# Patient Record
Sex: MALE | Race: WHITE | NOT HISPANIC OR LATINO | Employment: UNEMPLOYED | URBAN - METROPOLITAN AREA
[De-identification: names, ages, dates, MRNs, and addresses within clinical notes are randomized per-mention and may not be internally consistent; named-entity substitution may affect disease eponyms.]

---

## 2019-08-25 ENCOUNTER — HOSPITAL ENCOUNTER (INPATIENT)
Facility: MEDICAL CENTER | Age: 64
LOS: 2 days | DRG: 041 | End: 2019-08-28
Attending: EMERGENCY MEDICINE | Admitting: INTERNAL MEDICINE
Payer: COMMERCIAL

## 2019-08-25 ENCOUNTER — HOSPITAL ENCOUNTER (OUTPATIENT)
Facility: MEDICAL CENTER | Age: 64
End: 2019-08-25

## 2019-08-25 DIAGNOSIS — R29.898 WEAKNESS OF LEFT UPPER EXTREMITY: ICD-10-CM

## 2019-08-25 PROCEDURE — 94760 N-INVAS EAR/PLS OXIMETRY 1: CPT

## 2019-08-25 PROCEDURE — 99291 CRITICAL CARE FIRST HOUR: CPT

## 2019-08-26 ENCOUNTER — APPOINTMENT (OUTPATIENT)
Dept: CARDIOLOGY | Facility: MEDICAL CENTER | Age: 64
DRG: 041 | End: 2019-08-26
Attending: INTERNAL MEDICINE
Payer: COMMERCIAL

## 2019-08-26 ENCOUNTER — APPOINTMENT (OUTPATIENT)
Dept: RADIOLOGY | Facility: MEDICAL CENTER | Age: 64
DRG: 041 | End: 2019-08-26
Attending: INTERNAL MEDICINE
Payer: COMMERCIAL

## 2019-08-26 ENCOUNTER — HOSPITAL ENCOUNTER (OUTPATIENT)
Dept: RADIOLOGY | Facility: MEDICAL CENTER | Age: 64
End: 2019-08-26

## 2019-08-26 PROBLEM — E87.1 HYPONATREMIA: Status: ACTIVE | Noted: 2019-08-26

## 2019-08-26 PROBLEM — E03.9 HYPOTHYROIDISM: Status: ACTIVE | Noted: 2019-08-26

## 2019-08-26 PROBLEM — I10 PRIMARY HYPERTENSION: Status: ACTIVE | Noted: 2019-08-26

## 2019-08-26 PROBLEM — I63.9 STROKE (HCC): Status: ACTIVE | Noted: 2019-08-26

## 2019-08-26 PROBLEM — E66.9 OBESITY: Status: ACTIVE | Noted: 2019-08-26

## 2019-08-26 PROBLEM — D72.829 LEUKOCYTOSIS: Status: ACTIVE | Noted: 2019-08-26

## 2019-08-26 PROBLEM — E11.9 TYPE 2 DIABETES MELLITUS, WITHOUT LONG-TERM CURRENT USE OF INSULIN (HCC): Status: ACTIVE | Noted: 2019-08-26

## 2019-08-26 LAB
ANION GAP SERPL CALC-SCNC: 8 MMOL/L (ref 0–11.9)
APTT PPP: 29.4 SEC (ref 24.7–36)
BASOPHILS # BLD AUTO: 0.6 % (ref 0–1.8)
BASOPHILS # BLD: 0.07 K/UL (ref 0–0.12)
BUN SERPL-MCNC: 17 MG/DL (ref 8–22)
CALCIUM SERPL-MCNC: 8.6 MG/DL (ref 8.5–10.5)
CHLORIDE SERPL-SCNC: 103 MMOL/L (ref 96–112)
CHOLEST SERPL-MCNC: 164 MG/DL (ref 100–199)
CO2 SERPL-SCNC: 22 MMOL/L (ref 20–33)
CREAT SERPL-MCNC: 0.99 MG/DL (ref 0.5–1.4)
EOSINOPHIL # BLD AUTO: 0.42 K/UL (ref 0–0.51)
EOSINOPHIL NFR BLD: 3.5 % (ref 0–6.9)
ERYTHROCYTE [DISTWIDTH] IN BLOOD BY AUTOMATED COUNT: 44.6 FL (ref 35.9–50)
EST. AVERAGE GLUCOSE BLD GHB EST-MCNC: 103 MG/DL
GLUCOSE SERPL-MCNC: 91 MG/DL (ref 65–99)
HBA1C MFR BLD: 5.2 % (ref 0–5.6)
HCT VFR BLD AUTO: 38.5 % (ref 42–52)
HDLC SERPL-MCNC: 26 MG/DL
HGB BLD-MCNC: 12.8 G/DL (ref 14–18)
IMM GRANULOCYTES # BLD AUTO: 0.09 K/UL (ref 0–0.11)
IMM GRANULOCYTES NFR BLD AUTO: 0.7 % (ref 0–0.9)
INR PPP: 1.07 (ref 0.87–1.13)
LDLC SERPL CALC-MCNC: 101 MG/DL
LV EJECT FRACT  99904: 65
LV EJECT FRACT MOD 2C 99903: 61.43
LV EJECT FRACT MOD 4C 99902: 66.58
LV EJECT FRACT MOD BP 99901: 68.39
LYMPHOCYTES # BLD AUTO: 3.32 K/UL (ref 1–4.8)
LYMPHOCYTES NFR BLD: 27.3 % (ref 22–41)
MCH RBC QN AUTO: 30.6 PG (ref 27–33)
MCHC RBC AUTO-ENTMCNC: 33.2 G/DL (ref 33.7–35.3)
MCV RBC AUTO: 92.1 FL (ref 81.4–97.8)
MONOCYTES # BLD AUTO: 1.06 K/UL (ref 0–0.85)
MONOCYTES NFR BLD AUTO: 8.7 % (ref 0–13.4)
NEUTROPHILS # BLD AUTO: 7.2 K/UL (ref 1.82–7.42)
NEUTROPHILS NFR BLD: 59.2 % (ref 44–72)
NRBC # BLD AUTO: 0 K/UL
NRBC BLD-RTO: 0 /100 WBC
PLATELET # BLD AUTO: 237 K/UL (ref 164–446)
PMV BLD AUTO: 9.8 FL (ref 9–12.9)
POTASSIUM SERPL-SCNC: 4.7 MMOL/L (ref 3.6–5.5)
PROTHROMBIN TIME: 14.2 SEC (ref 12–14.6)
RBC # BLD AUTO: 4.18 M/UL (ref 4.7–6.1)
SODIUM SERPL-SCNC: 133 MMOL/L (ref 135–145)
TRIGL SERPL-MCNC: 184 MG/DL (ref 0–149)
TROPONIN T SERPL-MCNC: 14 NG/L (ref 6–19)
WBC # BLD AUTO: 12.2 K/UL (ref 4.8–10.8)

## 2019-08-26 PROCEDURE — 85610 PROTHROMBIN TIME: CPT

## 2019-08-26 PROCEDURE — 70551 MRI BRAIN STEM W/O DYE: CPT

## 2019-08-26 PROCEDURE — 93306 TTE W/DOPPLER COMPLETE: CPT | Mod: 26 | Performed by: INTERNAL MEDICINE

## 2019-08-26 PROCEDURE — 700102 HCHG RX REV CODE 250 W/ 637 OVERRIDE(OP): Performed by: INTERNAL MEDICINE

## 2019-08-26 PROCEDURE — 92610 EVALUATE SWALLOWING FUNCTION: CPT

## 2019-08-26 PROCEDURE — 83036 HEMOGLOBIN GLYCOSYLATED A1C: CPT

## 2019-08-26 PROCEDURE — 99223 1ST HOSP IP/OBS HIGH 75: CPT | Performed by: INTERNAL MEDICINE

## 2019-08-26 PROCEDURE — A9270 NON-COVERED ITEM OR SERVICE: HCPCS | Performed by: INTERNAL MEDICINE

## 2019-08-26 PROCEDURE — 72141 MRI NECK SPINE W/O DYE: CPT

## 2019-08-26 PROCEDURE — 99291 CRITICAL CARE FIRST HOUR: CPT | Performed by: INTERNAL MEDICINE

## 2019-08-26 PROCEDURE — 84484 ASSAY OF TROPONIN QUANT: CPT

## 2019-08-26 PROCEDURE — 80061 LIPID PANEL: CPT

## 2019-08-26 PROCEDURE — 93306 TTE W/DOPPLER COMPLETE: CPT

## 2019-08-26 PROCEDURE — 85730 THROMBOPLASTIN TIME PARTIAL: CPT

## 2019-08-26 PROCEDURE — 770022 HCHG ROOM/CARE - ICU (200)

## 2019-08-26 PROCEDURE — 700105 HCHG RX REV CODE 258: Performed by: INTERNAL MEDICINE

## 2019-08-26 PROCEDURE — 93880 EXTRACRANIAL BILAT STUDY: CPT

## 2019-08-26 PROCEDURE — 84443 ASSAY THYROID STIM HORMONE: CPT

## 2019-08-26 PROCEDURE — 85025 COMPLETE CBC W/AUTO DIFF WBC: CPT

## 2019-08-26 PROCEDURE — 80048 BASIC METABOLIC PNL TOTAL CA: CPT

## 2019-08-26 RX ORDER — BENAZEPRIL HYDROCHLORIDE 5 MG/1
5 TABLET ORAL DAILY
COMMUNITY

## 2019-08-26 RX ORDER — PROMETHAZINE HYDROCHLORIDE 25 MG/1
12.5-25 TABLET ORAL EVERY 4 HOURS PRN
Status: DISCONTINUED | OUTPATIENT
Start: 2019-08-26 | End: 2019-08-28 | Stop reason: HOSPADM

## 2019-08-26 RX ORDER — SODIUM CHLORIDE 9 MG/ML
INJECTION, SOLUTION INTRAVENOUS CONTINUOUS
Status: DISCONTINUED | OUTPATIENT
Start: 2019-08-26 | End: 2019-08-26

## 2019-08-26 RX ORDER — TRAMADOL HYDROCHLORIDE 50 MG/1
50 TABLET ORAL EVERY 8 HOURS PRN
COMMUNITY

## 2019-08-26 RX ORDER — TRAMADOL HYDROCHLORIDE 50 MG/1
50 TABLET ORAL ONCE
Status: COMPLETED | OUTPATIENT
Start: 2019-08-26 | End: 2019-08-26

## 2019-08-26 RX ORDER — BENAZEPRIL HYDROCHLORIDE 10 MG/1
5 TABLET ORAL DAILY
Status: DISCONTINUED | OUTPATIENT
Start: 2019-08-26 | End: 2019-08-27

## 2019-08-26 RX ORDER — DIVALPROEX SODIUM 500 MG/1
1000 TABLET, DELAYED RELEASE ORAL 2 TIMES DAILY
Status: DISCONTINUED | OUTPATIENT
Start: 2019-08-26 | End: 2019-08-28 | Stop reason: HOSPADM

## 2019-08-26 RX ORDER — ALFUZOSIN HYDROCHLORIDE 10 MG/1
10 TABLET, EXTENDED RELEASE ORAL DAILY
COMMUNITY

## 2019-08-26 RX ORDER — VENLAFAXINE 50 MG/1
50 TABLET ORAL
Status: DISCONTINUED | OUTPATIENT
Start: 2019-08-26 | End: 2019-08-28 | Stop reason: HOSPADM

## 2019-08-26 RX ORDER — VENLAFAXINE HYDROCHLORIDE 75 MG/1
150 CAPSULE, EXTENDED RELEASE ORAL DAILY
Status: DISCONTINUED | OUTPATIENT
Start: 2019-08-26 | End: 2019-08-28 | Stop reason: HOSPADM

## 2019-08-26 RX ORDER — BISACODYL 10 MG
10 SUPPOSITORY, RECTAL RECTAL
Status: DISCONTINUED | OUTPATIENT
Start: 2019-08-26 | End: 2019-08-28 | Stop reason: HOSPADM

## 2019-08-26 RX ORDER — ASPIRIN 81 MG/1
81 TABLET, CHEWABLE ORAL DAILY
COMMUNITY
End: 2019-09-06

## 2019-08-26 RX ORDER — LABETALOL HYDROCHLORIDE 5 MG/ML
10 INJECTION, SOLUTION INTRAVENOUS EVERY 4 HOURS PRN
Status: DISCONTINUED | OUTPATIENT
Start: 2019-08-26 | End: 2019-08-28 | Stop reason: HOSPADM

## 2019-08-26 RX ORDER — HYDRALAZINE HYDROCHLORIDE 20 MG/ML
10 INJECTION INTRAMUSCULAR; INTRAVENOUS
Status: DISCONTINUED | OUTPATIENT
Start: 2019-08-26 | End: 2019-08-28 | Stop reason: HOSPADM

## 2019-08-26 RX ORDER — DIVALPROEX SODIUM 125 MG/1
1000 CAPSULE, COATED PELLETS ORAL 2 TIMES DAILY
Status: ON HOLD | COMMUNITY
End: 2019-08-26

## 2019-08-26 RX ORDER — VENLAFAXINE HYDROCHLORIDE 150 MG/1
150 CAPSULE, EXTENDED RELEASE ORAL DAILY
COMMUNITY

## 2019-08-26 RX ORDER — ASPIRIN 300 MG/1
300 SUPPOSITORY RECTAL DAILY
Status: DISCONTINUED | OUTPATIENT
Start: 2019-08-27 | End: 2019-08-27

## 2019-08-26 RX ORDER — ASPIRIN 81 MG/1
324 TABLET, CHEWABLE ORAL DAILY
Status: DISCONTINUED | OUTPATIENT
Start: 2019-08-27 | End: 2019-08-27

## 2019-08-26 RX ORDER — ATORVASTATIN CALCIUM 80 MG/1
80 TABLET, FILM COATED ORAL EVERY EVENING
Status: DISCONTINUED | OUTPATIENT
Start: 2019-08-26 | End: 2019-08-28 | Stop reason: HOSPADM

## 2019-08-26 RX ORDER — TAMSULOSIN HYDROCHLORIDE 0.4 MG/1
0.4 CAPSULE ORAL
Status: DISCONTINUED | OUTPATIENT
Start: 2019-08-26 | End: 2019-08-28 | Stop reason: HOSPADM

## 2019-08-26 RX ORDER — ACETAMINOPHEN 325 MG/1
650 TABLET ORAL EVERY 6 HOURS PRN
Status: DISCONTINUED | OUTPATIENT
Start: 2019-08-26 | End: 2019-08-28 | Stop reason: HOSPADM

## 2019-08-26 RX ORDER — NAPROXEN 500 MG/1
500 TABLET ORAL 2 TIMES DAILY WITH MEALS
Status: ON HOLD | COMMUNITY
End: 2019-08-28

## 2019-08-26 RX ORDER — DIVALPROEX SODIUM 500 MG/1
1000 TABLET, DELAYED RELEASE ORAL 2 TIMES DAILY
COMMUNITY
End: 2021-05-17

## 2019-08-26 RX ORDER — OMEPRAZOLE 20 MG/1
20 CAPSULE, DELAYED RELEASE ORAL DAILY
COMMUNITY
End: 2021-10-12

## 2019-08-26 RX ORDER — LEVOTHYROXINE SODIUM 0.2 MG/1
150 TABLET ORAL
COMMUNITY
End: 2021-05-28

## 2019-08-26 RX ORDER — ASPIRIN 325 MG
325 TABLET ORAL DAILY
Status: DISCONTINUED | OUTPATIENT
Start: 2019-08-27 | End: 2019-08-27

## 2019-08-26 RX ORDER — ONDANSETRON 2 MG/ML
4 INJECTION INTRAMUSCULAR; INTRAVENOUS EVERY 4 HOURS PRN
Status: DISCONTINUED | OUTPATIENT
Start: 2019-08-26 | End: 2019-08-28 | Stop reason: HOSPADM

## 2019-08-26 RX ORDER — LEVOTHYROXINE SODIUM 0.2 MG/1
200 TABLET ORAL
Status: DISCONTINUED | OUTPATIENT
Start: 2019-08-26 | End: 2019-08-28 | Stop reason: HOSPADM

## 2019-08-26 RX ORDER — ONDANSETRON 4 MG/1
4 TABLET, ORALLY DISINTEGRATING ORAL EVERY 4 HOURS PRN
Status: DISCONTINUED | OUTPATIENT
Start: 2019-08-26 | End: 2019-08-28 | Stop reason: HOSPADM

## 2019-08-26 RX ORDER — PROMETHAZINE HYDROCHLORIDE 25 MG/1
12.5-25 SUPPOSITORY RECTAL EVERY 4 HOURS PRN
Status: DISCONTINUED | OUTPATIENT
Start: 2019-08-26 | End: 2019-08-28 | Stop reason: HOSPADM

## 2019-08-26 RX ORDER — POLYETHYLENE GLYCOL 3350 17 G/17G
1 POWDER, FOR SOLUTION ORAL
Status: DISCONTINUED | OUTPATIENT
Start: 2019-08-26 | End: 2019-08-28 | Stop reason: HOSPADM

## 2019-08-26 RX ORDER — VENLAFAXINE 50 MG/1
50 TABLET ORAL
COMMUNITY
End: 2021-05-17

## 2019-08-26 RX ORDER — PROCHLORPERAZINE EDISYLATE 5 MG/ML
5-10 INJECTION INTRAMUSCULAR; INTRAVENOUS EVERY 4 HOURS PRN
Status: DISCONTINUED | OUTPATIENT
Start: 2019-08-26 | End: 2019-08-28 | Stop reason: HOSPADM

## 2019-08-26 RX ORDER — ALFUZOSIN HYDROCHLORIDE 10 MG/1
10 TABLET, EXTENDED RELEASE ORAL DAILY
Status: DISCONTINUED | OUTPATIENT
Start: 2019-08-26 | End: 2019-08-26

## 2019-08-26 RX ORDER — AMOXICILLIN 250 MG
2 CAPSULE ORAL 2 TIMES DAILY
Status: DISCONTINUED | OUTPATIENT
Start: 2019-08-26 | End: 2019-08-28 | Stop reason: HOSPADM

## 2019-08-26 RX ORDER — OMEPRAZOLE 20 MG/1
20 CAPSULE, DELAYED RELEASE ORAL DAILY
Status: DISCONTINUED | OUTPATIENT
Start: 2019-08-26 | End: 2019-08-28 | Stop reason: HOSPADM

## 2019-08-26 RX ADMIN — SODIUM CHLORIDE: 9 INJECTION, SOLUTION INTRAVENOUS at 04:45

## 2019-08-26 RX ADMIN — TRAMADOL HYDROCHLORIDE 50 MG: 50 TABLET, FILM COATED ORAL at 22:03

## 2019-08-26 RX ADMIN — TAMSULOSIN HYDROCHLORIDE 0.4 MG: 0.4 CAPSULE ORAL at 15:46

## 2019-08-26 RX ADMIN — LEVOTHYROXINE SODIUM 200 MCG: 25 TABLET ORAL at 15:44

## 2019-08-26 RX ADMIN — VENLAFAXINE 50 MG: 50 TABLET ORAL at 22:03

## 2019-08-26 RX ADMIN — BENAZEPRIL HYDROCHLORIDE 5 MG: 10 TABLET ORAL at 15:43

## 2019-08-26 RX ADMIN — ATORVASTATIN CALCIUM 80 MG: 80 TABLET, FILM COATED ORAL at 17:54

## 2019-08-26 RX ADMIN — VENLAFAXINE HYDROCHLORIDE 150 MG: 75 CAPSULE, EXTENDED RELEASE ORAL at 15:47

## 2019-08-26 RX ADMIN — OMEPRAZOLE 20 MG: 20 CAPSULE, DELAYED RELEASE ORAL at 15:46

## 2019-08-26 RX ADMIN — VITAMIN D, TAB 1000IU (100/BT) 1000 UNITS: 25 TAB at 15:47

## 2019-08-26 RX ADMIN — DIVALPROEX SODIUM 1000 MG: 500 TABLET, DELAYED RELEASE ORAL at 17:55

## 2019-08-26 RX ADMIN — SENNOSIDES, DOCUSATE SODIUM 2 TABLET: 50; 8.6 TABLET, FILM COATED ORAL at 17:55

## 2019-08-26 ASSESSMENT — ENCOUNTER SYMPTOMS
HEMOPTYSIS: 0
FEVER: 0
DIARRHEA: 0
NECK PAIN: 0
PHOTOPHOBIA: 0
BLOOD IN STOOL: 0
MYALGIAS: 0
FOCAL WEAKNESS: 1
DIZZINESS: 0
SORE THROAT: 0
SENSORY CHANGE: 0
DIAPHORESIS: 0
BRUISES/BLEEDS EASILY: 0
CHILLS: 0
NERVOUS/ANXIOUS: 0
SHORTNESS OF BREATH: 0
FLANK PAIN: 0
BLURRED VISION: 0
SEIZURES: 0
SPEECH CHANGE: 0
SPUTUM PRODUCTION: 0
HALLUCINATIONS: 0
EYE DISCHARGE: 0
BACK PAIN: 0
NAUSEA: 0
PALPITATIONS: 0
WHEEZING: 0
COUGH: 0
VOMITING: 0
ABDOMINAL PAIN: 0
EYE REDNESS: 0
HEADACHES: 0
STRIDOR: 0

## 2019-08-26 ASSESSMENT — COGNITIVE AND FUNCTIONAL STATUS - GENERAL
SUGGESTED CMS G CODE MODIFIER DAILY ACTIVITY: CJ
MOBILITY SCORE: 22
SUGGESTED CMS G CODE MODIFIER MOBILITY: CJ
HELP NEEDED FOR BATHING: A LITTLE
CLIMB 3 TO 5 STEPS WITH RAILING: A LITTLE
DRESSING REGULAR LOWER BODY CLOTHING: A LITTLE
WALKING IN HOSPITAL ROOM: A LITTLE
DAILY ACTIVITIY SCORE: 22

## 2019-08-26 ASSESSMENT — LIFESTYLE VARIABLES
HAVE YOU EVER FELT YOU SHOULD CUT DOWN ON YOUR DRINKING: NO
ALCOHOL_USE: NO
EVER HAD A DRINK FIRST THING IN THE MORNING TO STEADY YOUR NERVES TO GET RID OF A HANGOVER: NO
EVER HAD A DRINK FIRST THING IN THE MORNING TO STEADY YOUR NERVES TO GET RID OF A HANGOVER: NO
HOW MANY TIMES IN THE PAST YEAR HAVE YOU HAD 5 OR MORE DRINKS IN A DAY: 0
HAVE PEOPLE ANNOYED YOU BY CRITICIZING YOUR DRINKING: NO
HOW MANY TIMES IN THE PAST YEAR HAVE YOU HAD 5 OR MORE DRINKS IN A DAY: 0
SUBSTANCE_ABUSE: 0
AVERAGE NUMBER OF DAYS PER WEEK YOU HAVE A DRINK CONTAINING ALCOHOL: 0
ALCOHOL_USE: NO
HAVE PEOPLE ANNOYED YOU BY CRITICIZING YOUR DRINKING: NO
CONSUMPTION TOTAL: INCOMPLETE
EVER FELT BAD OR GUILTY ABOUT YOUR DRINKING: NO
ON A TYPICAL DAY WHEN YOU DRINK ALCOHOL HOW MANY DRINKS DO YOU HAVE: 0
HAVE YOU EVER FELT YOU SHOULD CUT DOWN ON YOUR DRINKING: NO
TOTAL SCORE: 0
TOTAL SCORE: 0
CONSUMPTION TOTAL: NEGATIVE
ON A TYPICAL DAY WHEN YOU DRINK ALCOHOL HOW MANY DRINKS DO YOU HAVE: 0
AVERAGE NUMBER OF DAYS PER WEEK YOU HAVE A DRINK CONTAINING ALCOHOL: 0
EVER_SMOKED: YES
TOTAL SCORE: 0

## 2019-08-26 NOTE — THERAPY
"Speech Language Therapy Clinical Swallow Evaluation completed.    Functional Status: The patient was AAOx4, no facial droop, slurred speech or deficits noted in oral exam.  Pt reports his LUE is getting stronger s/p tPA.  He consumed PO trials of ice chips, nectars, puree, soft solids, thin liquids and nectars without any overt s/sx of aspiration.  Vocal quality remained clear throughout evaluation, laryngeal elevation palpated as complete with all textures and oral phase was functional with all textures.  Recommend to start a regular diet with thin liquids.  SLP will continue to follow to ensure diet tolerance s/p tPA.     Recommendations - Diet: Regular, Thin Liquid                          Strategies: Monitor during meals and Head of Bed at 90 Degrees                          Medication Administration:  Whole with Liquid Wash    Plan of Care: Will benefit from Speech Therapy 3 times per week    Post-Acute Therapy: Anticipate that the patient will have no further speech therapy needs after discharge from the hospital.    See \"Rehab Therapy-Acute\" Patient Summary Report for complete documentation.  Thank you for the consult.  "

## 2019-08-26 NOTE — ASSESSMENT & PLAN NOTE
Body mass index is 35.62 kg/m².  Patient has been counseled on diet and lifestyle modifications  Nutritionist consult for pt education

## 2019-08-26 NOTE — ASSESSMENT & PLAN NOTE
Acute ischemic stroke symptoms characterized by the sudden onset of left upper extremity weakness  CT scan of the head negative for acute pathology at outside hospital  S/P alteplase at outside hospital  MRI reviewed  Continue high intensity statin  DAPT  No significant carotid stenosis - loop recorder ordered  Neuro checks every 4 hours

## 2019-08-26 NOTE — DISCHARGE PLANNING
Transitional Care Coordination     Referral from Dr. Singh.   Pt transferred from [place] for evaluation of suspected stroke. NIH score at 4 at the outside hospital, and in ED was 1-2.    Past medical history of hypertension, diabetes, chronic neck pain, hypothyroidism bipolar disorder.    TPA administered.   Stroke work up pending.   OT/PT/SLP (reg diet) consulted.    This referral will not be sent to [facility] for a consult per protocol.    Thank you for referral.   Will continue to follow.

## 2019-08-26 NOTE — PROGRESS NOTES
NIHSS and neuro exam completed in ER with ER RN.  Pt and wife escorted to R102 by ACLS RN and CCT.  Pt oriented to unit,

## 2019-08-26 NOTE — ED PROVIDER NOTES
"ED Provider Note    Scribed for Celena Feldman M.D. by Brandon Garcia. 8/26/2019  12:21 AM    Means of arrival: EMS  History obtained from: Patient  History limited by: None      CHIEF COMPLAINT  Chief Complaint   Patient presents with   • Possible Stroke     TPA administered pta       HPI  Jeremi Ingram is a 64 y.o. male with past medical history of hypertension, diabetes, bipolar disorder, who presents to the Emergency Department as a transfer from [place] for evaluation of suspected stroke with acute weakness to the left upper extremity onset 4 hours ago. Patient was administered TPA prior to arrival after a normal noncontrast head CT at the outside hospital.  Since receiving the medication, the patient reports moderate alleviation of weakness. He states that at around 8:30 tonight the patient was heading inside his house after working in the garage when he noticed that he could not  an object and lost control of his arm. Endorsed pain in the back on his neck described as \"heat\".  Patient denies current complaints at this time.  Denies any associated chest pain or shortness of breath. Patient additionally states that for the past 2 months he has become increasingly lightheaded when standing up.    REVIEW OF SYSTEMS  Pertinent positive include unilateral left sided upper extremity weakness and neck pain. Pertinent negative include no chest pain or dyspnea. All other systems reviewed and are negative.      PAST MEDICAL HISTORY   has a past medical history of Bipolar 1 disorder (HCC).    SOCIAL HISTORY  Social History     Tobacco Use   • Smoking status: Not on file   Substance and Sexual Activity   • Alcohol use: Not on file   • Drug use: Not on file   • Sexual activity: Not on file       SURGICAL HISTORY   has a past surgical history that includes polysomnography w cpap (4/6/2017).    CURRENT MEDICATIONS  Home Medications    **Home medications have not yet been reviewed for this encounter**   " "      ALLERGIES  Allergies   Allergen Reactions   • Lithium        PHYSICAL EXAM   VITAL SIGNS: /72   Pulse (!) 59   Temp 36.8 °C (98.2 °F) (Temporal)   Resp 12   Ht 1.854 m (6' 1\")   Wt 122.5 kg (270 lb)   SpO2 95%   BMI 35.62 kg/m²    Constitutional: Nontoxic appearing middle-aged male.  Alert in no apparent distress.  HENT: Normocephalic, Atraumatic. Bilateral external ears normal. Nose normal.  Moist mucous membranes.  Oropharynx clear.  Eyes: Pupils are equal and reactive. Conjunctiva normal.   Neck: Supple, full range of motion  Heart: Regular rate and rhythm.  No murmurs.    Lungs: No respiratory distress, normal work of breathing. Lungs clear to auscultation bilaterally.  Abdomen Soft, no distention.  No tenderness to palpation.  Musculoskeletal: Atraumatic. No obvious deformities noted.  No lower extremity edema.  Skin: Warm, Dry.  No erythema, No rash.   Neurologic: Alert and oriented x3.  Cranial nerves II-XII intact. Left upper extremity has 4/5 strength with weakness noted to be more proximal than distal. Strength 5/5 and sensation inact throughout all other extremities.  No pronator drift.  No dysmetria.  Normal speech. Normal gait.  Psychiatric: Affect normal, Mood normal, Appears appropriate and not intoxicated.      DIAGNOSTIC STUDIES    LABS  Personally reviewed by me  Labs Reviewed   CBC WITH DIFFERENTIAL - Abnormal; Notable for the following components:       Result Value    WBC 12.2 (*)     RBC 4.18 (*)     Hemoglobin 12.8 (*)     Hematocrit 38.5 (*)     MCHC 33.2 (*)     Monos (Absolute) 1.06 (*)     All other components within normal limits    Narrative:     Indicate which anticoagulants the patient is on:->UNKNOWN   BASIC METABOLIC PANEL - Abnormal; Notable for the following components:    Sodium 133 (*)     All other components within normal limits   PROTHROMBIN TIME    Narrative:     Indicate which anticoagulants the patient is on:->UNKNOWN   APTT    Narrative:     Indicate " "which anticoagulants the patient is on:->UNKNOWN   TROPONIN   ESTIMATED GFR         RADIOLOGY  Personally reviewed by me  OUTSIDE IMAGES-DX CHEST   Final Result      OUTSIDE IMAGES-CT HEAD   Final Result          ED COURSE  Vitals:    08/26/19 0015 08/26/19 0030 08/26/19 0045 08/26/19 0100   BP: 126/72 129/68 130/71 141/82   Pulse: (!) 59 63 62 (!) 57   Resp: 12 17 14 20   Temp:       TempSrc:       SpO2: 95% 88% 92% 95%   Weight:  122.5 kg (270 lb)     Height:  1.854 m (6' 1\")           Medications administered:  Medications - No data to display      Old records personally reviewed:  Patient was administered TPA prior to arrival after consultation with Dr. Quick  Reviewed outside records with normal CT of the head and reassuring labs.    12:21 AM Patient seen and examined at bedside. The patient presents with suspected stroke with unilateral left sided upper extremity weakness. Ordered for CBC with diff, BMP, troponin, PT/INR, and APTT to evaluate.     MEDICAL DECISION MAKING  Patient with history of hypertension and diabetes presents as a transfer from an outside hospital after receiving TPA for concern for acute CVA.  On arrival here, the patient is well-appearing with normal vital signs.  NIH score was 4 at the outside hospital due to isolated left upper extremity weakness which is now improving.  NIH score now 1-2.  No new focal neurologic deficits on exam.  Reviewed EKG from outside hospital without ischemia or arrhythmia.  Labs are reassuring with mild hyponatremia and mild leukocytosis however no other acute abnormalities.  He understands plan of care for admission to the hospital for further neuroimaging in the morning.    12:41 AM I discussed the patient's case and the above findings with Dr. Quick (Neuro) who recommends and MRI and MRA in the morning and to admit the patient to the hospital.    1:00 AM I discussed the patient with Dr. Singh, hospitalist on-call, who accepts admission of the patient.  " The patient is stable at this time for transfer to the floor.      DISPOSITION:  Patient will be admitted to Dr. Singh in guarded condition.    IMPRESSION  (R29.898) Weakness of left upper extremity    Results, diagnoses, and treatment options were discussed with the patient and/or family. Patient verbalized understanding of plan of care.    New Prescriptions    No medications on file            Brandon LUNA (Scribe), am scribing for, and in the presence of, Celena Feldman M.D..    Electronically signed by: Brandon Garcia (Scribe), 8/26/2019    ICelena M.D. personally performed the services described in this documentation, as scribed by Brandon Garcia in my presence, and it is both accurate and complete.    C.    The note accurately reflects work and decisions made by me.  Celena Feldman  8/26/2019  2:04 AM

## 2019-08-26 NOTE — CARE PLAN
Problem: Acute Care of the Stroke Patient  Goal: Optimal Outcome for the Stroke patient  Outcome: PROGRESSING AS EXPECTED  Intervention: Vital Signs and Neuro Checks per order  Note:   VS and neuro checks performed per policy post alteplase.  Intervention: NIHSS completed and documented  Note:   NIHSS completed every shift with primary RN and oncoming RN.

## 2019-08-26 NOTE — CONSULTS
Neurology Consult Note:  Resident:  Doni Harrington M.D.  Attending: Dr. Kyle Sandra     Date of Note:  2019    Referring MD:  Dr. Singh      Patient ID:  Jeremi Urias 1955   Age/Sex 64 y.o. male   MRN [id number]       Chief Complaint / Reason for Consult:  Stroke    History of Presenting Illness:  Jreemi Ingram is a 65 y/o male with prior history of HTN, bipolar disorder, chronic neck pain s/p cervical fusion surgery in . He was brought in to the [facility] yesterday with the chief compliant of sudden inset left upper extremity weakness. According to him, he started having sudden onset left arm weakness at 8:30 pm on 19. He was unable to lift his arm or move his fingers on left side. He also c/o loss of sensations in left upper extremity. He denies any vision changes, focal weakness or sensory loss in any other body part, headache, confusion, speech changes, difficulty walking, facial droop, syncope, chest pain, shortness of breath or shaking of any body part before and during the episode. He was taken to the hospital in [place] by his wife. He was found to have NIHSS of 4 on arrival and received tPA after negative CT scan of the head at around 10 pm. He was transferred to [facility] for further work up. According to him, he started having improvement in his left arm during his way to the [facility] and was able to move his fingers. In [facility] ED, patent was stable and his NIHSS was 2. He was admitted to the ICU for close monitoring after an acute episode of stroke s/p tPA. His lab work is WNL except mild leukocytosis and hyponatremia. He denies fever, chills, bowel/bladder changes, palpitation, dizziness. Patient has been taking benazepril for HTN for many years. He c/o intermittent chronic neck pain for many years. He developed difficulty in vision with the right eye 2 years ago. According to him, his ophthalmologist told that it was due to stroke. He has been following up with PCP regularly. He  used to smoke 2 PPD but quit 15 years ago; denies alcohol or drug use.      No acute events overnight. Vitals signs stable. Patient has no compliant and he noticed remarkable improvement in weakness in his left upper extremity. NIHSS is 1; has mild weakness in his left arm. MRI brain pending.     Review of Symptoms  GEN/CONST:   Denies fever, or significant weight change.   H/E:   Denies blurry vision or eye pain.  ENT:  Denies nasal congestion, sore throat, or ear pain.   CARDIO: Denies chest pain, palpitations, orthopnea, or edema.   RESP:  Denies shortness of breath, wheezing, or coughing.    GI:  Denies nausea, vomiting, diarrhea, constipation, or abdominal pain.   : Denies dysuria or frequency.    HEME: Denies easy bruising or bleeding,    ALLG: Denies allergies, asthma, or hives.    MSK: Left upper extremity weakness. Denies joint pains, or swellings,   SKIN: Denies rashes, lumps/bumps, or sores.   NEURO: Denies headache, confusion, memory loss, or paresthesias.   ENDO: Denies heat or cold intolerance, polyuria, or polydipsia.  PSYCH: Denies mood disorders or substance abuse.            Past Medical History:   Past Medical History:   Diagnosis Date   • Bipolar 1 disorder (HCC)        Past Surgical History:  Past Surgical History:   Procedure Laterality Date   • PB POLYSOMNOGRAPHY W/CPAP  4/6/2017              Family History:  No family history on file.    Social History:  Social History     Socioeconomic History   • Marital status:      Spouse name: Not on file   • Number of children: Not on file   • Years of education: Not on file   • Highest education level: Not on file   Occupational History   • Not on file   Social Needs   • Financial resource strain: Not on file   • Food insecurity:     Worry: Not on file     Inability: Not on file   • Transportation needs:     Medical: Not on file     Non-medical: Not on file   Tobacco Use   • Smoking status: Not on file   Substance and Sexual Activity   •  Alcohol use: Not on file   • Drug use: Not on file   • Sexual activity: Not on file   Lifestyle   • Physical activity:     Days per week: Not on file     Minutes per session: Not on file   • Stress: Not on file   Relationships   • Social connections:     Talks on phone: Not on file     Gets together: Not on file     Attends Mandaeism service: Not on file     Active member of club or organization: Not on file     Attends meetings of clubs or organizations: Not on file     Relationship status: Not on file   • Intimate partner violence:     Fear of current or ex partner: Not on file     Emotionally abused: Not on file     Physically abused: Not on file     Forced sexual activity: Not on file   Other Topics Concern   • Not on file   Social History Narrative   • Not on file       Medication Allergy/Sensitivities:  Allergies   Allergen Reactions   • Lithium      Visual disturbance         Current Outpatient Medications:  Home Medications     Reviewed by Mindy Kraft, PharmD (Pharmacist) on 08/26/19 at 1322  Med List Status: Complete   Medication Last Dose Status   alfuzosin (UROXATRAL) 10 MG SR tablet 8/25/2019 Active   aspirin (ASA) 81 MG Chew Tab chewable tablet 8/25/2019 Active   benazepril (LOTENSIN) 5 MG Tab 8/25/2019 Active   Cholecalciferol (VITAMIN D3) 5000 units Cap 8/25/2019 Active   divalproex (DEPAKOTE) 500 MG Tablet Delayed Response 8/25/2019 Active   levothyroxine (SYNTHROID) 200 MCG Tab 8/25/2019 Active   naproxen (NAPROSYN) 500 MG Tab 8/25/2019 Active   omeprazole (PRILOSEC) 20 MG delayed-release capsule 8/25/2019 Active   tramadol (ULTRAM) 50 MG Tab 8/25/2019 Active   venlafaxine (EFFEXOR) 50 MG tablet 8/24/2019 Active   venlafaxine (EFFEXOR-XR) 150 MG extended-release capsule 8/25/2019 Active                Current Hospital Medications:    Current Facility-Administered Medications:   •  senna-docusate (PERICOLACE or SENOKOT S) 8.6-50 MG per tablet 2 Tab, 2 Tab, Oral, BID **AND** polyethylene  glycol/lytes (MIRALAX) PACKET 1 Packet, 1 Packet, Oral, QDAY PRN **AND** magnesium hydroxide (MILK OF MAGNESIA) suspension 30 mL, 30 mL, Oral, QDAY PRN **AND** bisacodyl (DULCOLAX) suppository 10 mg, 10 mg, Rectal, QDAY PRN, Frandy Singh M.D.  •  Respiratory Care per Protocol, , Nebulization, Continuous RT, Frandy Singh M.D.  •  acetaminophen (TYLENOL) tablet 650 mg, 650 mg, Oral, Q6HRS PRN, Frandy Singh M.D.  •  ondansetron (ZOFRAN) syringe/vial injection 4 mg, 4 mg, Intravenous, Q4HRS PRN, Frandy Singh M.D.  •  ondansetron (ZOFRAN ODT) dispertab 4 mg, 4 mg, Oral, Q4HRS PRN, Frandy Singh M.D.  •  promethazine (PHENERGAN) tablet 12.5-25 mg, 12.5-25 mg, Oral, Q4HRS PRN, Frandy Singh M.D.  •  promethazine (PHENERGAN) suppository 12.5-25 mg, 12.5-25 mg, Rectal, Q4HRS PRN, Frandy Singh M.D.  •  prochlorperazine (COMPAZINE) injection 5-10 mg, 5-10 mg, Intravenous, Q4HRS PRN, Frandy Singh M.D.  •  labetalol (NORMODYNE,TRANDATE) injection 10 mg, 10 mg, Intravenous, Q4HRS PRN **OR** hydrALAZINE (APRESOLINE) injection 10 mg, 10 mg, Intravenous, Q2HRS PRN, Frandy Singh M.D.  •  atorvastatin (LIPITOR) tablet 80 mg, 80 mg, Oral, Q EVENING, Frandy Singh M.D.  •  [START ON 8/27/2019] aspirin (ASA) tablet 325 mg, 325 mg, Oral, DAILY **OR** [START ON 8/27/2019] aspirin (ASA) chewable tab 324 mg, 324 mg, Oral, DAILY **OR** [START ON 8/27/2019] aspirin (ASA) suppository 300 mg, 300 mg, Rectal, DAILY, Frandy Singh M.D.  •  alfuzosin (UROXATRAL) 24 hr tablet 10 mg, 10 mg, Oral, DAILY, Jair Arnett Jr., D.O.  •  benazepril (LOTENSIN) tablet 5 mg, 5 mg, Oral, DAILY, Jair Arnett Jr., D.O.  •  divalproex (DEPAKOTE) delayed-release tablet 1,000 mg, 1,000 mg, Oral, BID, Jair Arnett Jr., D.O.  •  levothyroxine (SYNTHROID) tablet 200 mcg, 200 mcg, Oral, AM ES, Jair Arnett Jr., D.O.  •  omeprazole (PRILOSEC) capsule 20 mg, 20 mg, Oral, DAILY, Jair Arnett Jr., D.O.  •  venlafaxine (EFFEXOR) 50 mg, 50 mg, Oral, QHS, Jair GRANADOS  "Hope Conn D.O.  •  venlafaxine (EFFEXOR-XR) CP24 150 mg, 150 mg, Oral, DAILY, Jair Arnett Jr., D.O.  •  vitamin D (cholecalciferol) tablet 1,000 Units, 1,000 Units, Oral, DAILY, Jair Arnett Jr., D.O.        Physical Exam     Vitals:    08/26/19 1000 08/26/19 1100 08/26/19 1200 08/26/19 1300   BP: 134/79 126/84 139/70 136/81   Pulse: 60 65 (!) 55 (!) 58   Resp: 18 16 16 14   Temp: 37.2 °C (98.9 °F)  37.2 °C (99 °F)    TempSrc: Temporal  Temporal    SpO2: 92% 95% 94% 95%   Weight:       Height:         Body mass index is 34.99 kg/m².  /81   Pulse (!) 58   Temp 37.2 °C (99 °F) (Temporal)   Resp 14   Ht 1.854 m (6' 1\")   Wt 120.3 kg (265 lb 3.4 oz)   SpO2 95%   BMI 34.99 kg/m²   O2 therapy: Pulse Oximetry: 95 %, O2 Delivery: None (Room Air)    Physical Exam  GEN:   Alert and oriented, No apparent distress.    H / E:   Normocephalic, Atraumatic.  No scleral icterus.     ENT:   No erythema.  No exudates or discharges.   Trachea midline.  No stridor.  Supple neck.   HEART:  Regular rate and rhythm. No murmurs, rubs or gallops.    LUNGS:   Clear to auscultation and percussion bilaterally.   ABD/GI:  Benign. No rebound or guarding noted.  EXT/MSK:  No clubbing, cyanosis, edema.    NEURO:    Mental status: Awake, alert, oriented x3.  Fund of knowledge - within normal.   Cranial nerves:  CN II-XII - intact.  PERRLA.  EOMI.  No nystagmus   Motor - UE - 5/5 , bilaterally symmetrical.  LE - 5/5, bilaterally symmetrical.   Sensory - UE-  good sensation, bilaterally.  LE -  good sensation, bilaterally.    Coordination/Gait -  normal coordination.  Normal gait.     Reflexes - DTR - UE -  normal, and bilaterally symmetrical.  LE -  normal, and bilaterally symmetrical.    Babinski - Negative.    No ataxia, No tremor, Normal gait.   NIHSS 1: Mild weakness in left upper extremity.    PSYCH:  Normal thought process.          Data Review       Records Reviewed       Lab Data Review:  Recent Results (from the past 24 " hour(s))   CBC WITH DIFFERENTIAL    Collection Time: 08/26/19 12:51 AM   Result Value Ref Range    WBC 12.2 (H) 4.8 - 10.8 K/uL    RBC 4.18 (L) 4.70 - 6.10 M/uL    Hemoglobin 12.8 (L) 14.0 - 18.0 g/dL    Hematocrit 38.5 (L) 42.0 - 52.0 %    MCV 92.1 81.4 - 97.8 fL    MCH 30.6 27.0 - 33.0 pg    MCHC 33.2 (L) 33.7 - 35.3 g/dL    RDW 44.6 35.9 - 50.0 fL    Platelet Count 237 164 - 446 K/uL    MPV 9.8 9.0 - 12.9 fL    Neutrophils-Polys 59.20 44.00 - 72.00 %    Lymphocytes 27.30 22.00 - 41.00 %    Monocytes 8.70 0.00 - 13.40 %    Eosinophils 3.50 0.00 - 6.90 %    Basophils 0.60 0.00 - 1.80 %    Immature Granulocytes 0.70 0.00 - 0.90 %    Nucleated RBC 0.00 /100 WBC    Neutrophils (Absolute) 7.20 1.82 - 7.42 K/uL    Lymphs (Absolute) 3.32 1.00 - 4.80 K/uL    Monos (Absolute) 1.06 (H) 0.00 - 0.85 K/uL    Eos (Absolute) 0.42 0.00 - 0.51 K/uL    Baso (Absolute) 0.07 0.00 - 0.12 K/uL    Immature Granulocytes (abs) 0.09 0.00 - 0.11 K/uL    NRBC (Absolute) 0.00 K/uL   PROTHROMBIN TIME (INR)    Collection Time: 08/26/19 12:51 AM   Result Value Ref Range    PT 14.2 12.0 - 14.6 sec    INR 1.07 0.87 - 1.13   APTT    Collection Time: 08/26/19 12:51 AM   Result Value Ref Range    APTT 29.4 24.7 - 36.0 sec   TROPONIN    Collection Time: 08/26/19  1:20 AM   Result Value Ref Range    Troponin T 14 6 - 19 ng/L   Basic Metabolic Panel    Collection Time: 08/26/19  1:20 AM   Result Value Ref Range    Sodium 133 (L) 135 - 145 mmol/L    Potassium 4.7 3.6 - 5.5 mmol/L    Chloride 103 96 - 112 mmol/L    Co2 22 20 - 33 mmol/L    Glucose 91 65 - 99 mg/dL    Bun 17 8 - 22 mg/dL    Creatinine 0.99 0.50 - 1.40 mg/dL    Calcium 8.6 8.5 - 10.5 mg/dL    Anion Gap 8.0 0.0 - 11.9   ESTIMATED GFR    Collection Time: 08/26/19  1:20 AM   Result Value Ref Range    GFR If African American >60 >60 mL/min/1.73 m 2    GFR If Non African American >60 >60 mL/min/1.73 m 2   Hemoglobin A1C    Collection Time: 08/26/19  1:20 AM   Result Value Ref Range     Glycohemoglobin 5.2 0.0 - 5.6 %    Est Avg Glucose 103 mg/dL   Lipid Profile    Collection Time: 08/26/19  1:20 AM   Result Value Ref Range    Cholesterol,Tot 164 100 - 199 mg/dL    Triglycerides 184 (H) 0 - 149 mg/dL    HDL 26 (A) >=40 mg/dL     (H) <100 mg/dL       Imaging/Procedures Review:    Imaging Review: Completed     OUTSIDE IMAGES-DX CHEST   Final Result      OUTSIDE IMAGES-CT HEAD   Final Result      EC-ECHOCARDIOGRAM COMPLETE W/O CONT    (Results Pending)   MR-BRAIN-W/O    (Results Pending)   MR-CERVICAL SPINE-W/O    (Results Pending)   US-CAROTID DOPPLER BILAT    (Results Pending)               Assessment & Plan     Stroke (HCC)- (present on admission)  Assessment & Plan  Sudden onset of left upper extremity weakness.  Status post TPA outlying facility.   Patient will be admitted to the ICU with neurochecks per TPA protocol  Patient will be placed on continuous cardiac monitoring to evaluate for any arrhythmias  I will order MRI brain, MRA head and neck  I will order MRI cervical spine to evaluate for possible cervical stenosis  Check 2-D echo  Patient will be started on full dose aspirin 24 hours after TPA administration  Patient is a started on high intensity atorvastatin  Strict blood pressure control with IV hydralazine for SBP greater than 180/105  IV fluid hydration with normal saline  Check TSH, lipid panel and hemoglobin A1c  PTOT and ST evaluation  Dr. Quick from neurology was consulted, appreciate recommendations.  He requested that we consult the morning neurologist to follow the patient          Leukocytosis- (present on admission)  Assessment & Plan  Likely reactive  Monitor CBC and vitals    Hyponatremia- (present on admission)  Assessment & Plan  IV fluid hydration with normal saline  Monitor BMP and assess response      Obesity- (present on admission)  Assessment & Plan  Body mass index is 35.62 kg/m².  Patient has been counseled on diet and lifestyle  modifications      Assessment and Plan:    Mr. Ingram was admitted for possible ischemic stroke. He had sudden onset left upper extrmity weakness without any associated symptoms; was given tPA after negative CT scan and was transferred to [facility] for further management. NIHSS improved from 4 to 1 today. Vitals are stable.    MRI brain, MRI C-spine, carotid U/S and 2 D echocardiogram are pending.    Plan:   1. Full dose ASA after 24 hr of tPA  2. High dose Statin  3. Maintain BP below 180/105 for 24 hours  4. Neuro checks Q4   5. PT/OT consults  6. Speech therapy consult  7. Lipid panel., HbA1C                A computerized dictation system may have been used for this note.    Despite review, there may be some spelling or grammatical errors.    Doni Harrington M.D.  8/26/2019   Service:  Neurology   Attending: Dr. Kyle Sandra

## 2019-08-26 NOTE — PROGRESS NOTES
Pulmonary/Critical Care Medicine   Progress Note    Date of service: 8/26/2019  Time: 1:28 PM    Patient seen overnight by Dr. Valdivia, examined again today by myself with improving neurologic symptoms trace weakness in the left upper extremity.  MRI and echocardiogram pending, carotid ultrasound ordered.  MRA head and neck canceled.  Neurology has been consulted (Dr. Cosme) by myself.    Otherwise care will continue as planned.      Jair Arnett Jr., RENNYO.  [facility] Critical Care

## 2019-08-26 NOTE — CONSULTS
PULMONARY AND CRITICAL CARE MEDICINE CONSULTATION    Date of Consultation:  8/26/2019    Requesting Physician:  Frandy Singh MD    Consulting Physician:  Lee Padilla MD    Reason for Consultation: Critical care management in gentleman with acute ischemic stroke    Chief Complaint: Left arm weakness    History of Present Illness:    I was kindly asked to see and evaluate Jeremi Ingram, a 64 y.o. male for evaluation and management of the above problem.    This gentleman has a history of a prior stroke involving only part of his visual field in his right eye, hypertension, diabetes mellitus type 2 which is diet controlled, bipolar disorder, chronic neck pain and hypothyroidism.  This evening at approximately 2030 hrs. he had the sudden onset of left arm paralysis.  He presented to an outside hospital.  CT scan of the head showed no acute pathology.  He was felt to be a candidate for alteplase and this was administered.  He was transferred to [facility] for subspecialty care.  He did not have any acute visual changes, headache or speech difficulties.  He is right-handed.    Medications Prior to Admission:    No current facility-administered medications on file prior to encounter.      No current outpatient medications on file prior to encounter.       Current Medications:      Current Facility-Administered Medications:   •  senna-docusate (PERICOLACE or SENOKOT S) 8.6-50 MG per tablet 2 Tab, 2 Tab, Oral, BID **AND** polyethylene glycol/lytes (MIRALAX) PACKET 1 Packet, 1 Packet, Oral, QDAY PRN **AND** magnesium hydroxide (MILK OF MAGNESIA) suspension 30 mL, 30 mL, Oral, QDAY PRN **AND** bisacodyl (DULCOLAX) suppository 10 mg, 10 mg, Rectal, QDAY PRN, Frandy Singh M.D.  •  Respiratory Care per Protocol, , Nebulization, Continuous RT, Frandy Singh M.D.  •  NS infusion, , Intravenous, Continuous, Frandy Singh M.D.  •  acetaminophen (TYLENOL) tablet 650 mg, 650 mg, Oral, Q6HRS PRN, Frandy Singh M.D.  •  ondansetron  (ZOFRAN) syringe/vial injection 4 mg, 4 mg, Intravenous, Q4HRS PRN, Frandy Singh M.D.  •  ondansetron (ZOFRAN ODT) dispertab 4 mg, 4 mg, Oral, Q4HRS PRN, Frandy Singh M.D.  •  promethazine (PHENERGAN) tablet 12.5-25 mg, 12.5-25 mg, Oral, Q4HRS PRN, Frandy Singh M.D.  •  promethazine (PHENERGAN) suppository 12.5-25 mg, 12.5-25 mg, Rectal, Q4HRS PRN, Frandy Singh M.D.  •  prochlorperazine (COMPAZINE) injection 5-10 mg, 5-10 mg, Intravenous, Q4HRS PRN, Frandy Signh M.D.  •  labetalol (NORMODYNE,TRANDATE) injection 10 mg, 10 mg, Intravenous, Q4HRS PRN **OR** hydrALAZINE (APRESOLINE) injection 10 mg, 10 mg, Intravenous, Q2HRS PRN, Frandy Singh M.D.  •  atorvastatin (LIPITOR) tablet 80 mg, 80 mg, Oral, Q EVENING, Frandy Singh M.D.  •  [START ON 8/27/2019] aspirin (ASA) tablet 325 mg, 325 mg, Oral, DAILY **OR** [START ON 8/27/2019] aspirin (ASA) chewable tab 324 mg, 324 mg, Oral, DAILY **OR** [START ON 8/27/2019] aspirin (ASA) suppository 300 mg, 300 mg, Rectal, DAILY, Frandy Singh M.D.  No current outpatient medications on file.    Allergies:    Lithium    Past Surgical History:    Past Surgical History:   Procedure Laterality Date   • PB POLYSOMNOGRAPHY W/CPAP  4/6/2017            Past Medical History:    Past Medical History:   Diagnosis Date   • Bipolar 1 disorder (HCC)        Social History:    Social History     Socioeconomic History   • Marital status:      Spouse name: Not on file   • Number of children: Not on file   • Years of education: Not on file   • Highest education level: Not on file   Occupational History   • Not on file   Social Needs   • Financial resource strain: Not on file   • Food insecurity:     Worry: Not on file     Inability: Not on file   • Transportation needs:     Medical: Not on file     Non-medical: Not on file   Tobacco Use   • Smoking status: Not on file   Substance and Sexual Activity   • Alcohol use: Not on file   • Drug use: Not on file   • Sexual activity: Not on file  "  Lifestyle   • Physical activity:     Days per week: Not on file     Minutes per session: Not on file   • Stress: Not on file   Relationships   • Social connections:     Talks on phone: Not on file     Gets together: Not on file     Attends [employer] service: Not on file     Active member of club or organization: Not on file     Attends meetings of clubs or organizations: Not on file     Relationship status: Not on file   • Intimate partner violence:     Fear of current or ex partner: Not on file     Emotionally abused: Not on file     Physically abused: Not on file     Forced sexual activity: Not on file   Other Topics Concern   • Not on file   Social History Narrative   • Not on file       Family History:    No family history on file.    Review of System:    Review of Systems   Constitutional: Negative for chills, diaphoresis and fever.   HENT: Negative for ear discharge, nosebleeds and tinnitus.    Eyes: Negative for blurred vision, photophobia, discharge and redness.   Respiratory: Negative for cough, hemoptysis, shortness of breath and stridor.    Cardiovascular: Negative for chest pain, palpitations and leg swelling.   Gastrointestinal: Negative for abdominal pain, blood in stool, nausea and vomiting.   Genitourinary: Negative for dysuria, hematuria and urgency.   Musculoskeletal: Negative for myalgias and neck pain.   Skin: Negative for rash.   Neurological: Positive for focal weakness. Negative for sensory change, speech change, seizures and headaches.   Endo/Heme/Allergies: Does not bruise/bleed easily.   Psychiatric/Behavioral: Negative for hallucinations, substance abuse and suicidal ideas. The patient is not nervous/anxious.        Physical Examination:    /74   Pulse 63   Temp 36.8 °C (98.2 °F) (Temporal)   Resp 15   Ht 1.854 m (6' 1\")   Wt 122.5 kg (270 lb)   SpO2 92%   BMI 35.62 kg/m²   Physical Exam   Constitutional: He is oriented to person, place, and time.   HENT:   Head: " Normocephalic and atraumatic.   Right Ear: External ear normal.   Left Ear: External ear normal.   Nose: Nose normal.   Eyes: Pupils are equal, round, and reactive to light. Conjunctivae are normal. Right eye exhibits no discharge. Left eye exhibits no discharge.   Neck: Normal range of motion. Neck supple. No tracheal deviation present.   Cardiovascular: Intact distal pulses. Exam reveals no gallop and no friction rub.   No murmur heard.  Sinus rhythm   Pulmonary/Chest: No stridor. He has no wheezes. He has no rales.   Abdominal: Soft. Bowel sounds are normal. He exhibits no distension. There is no tenderness. There is no rebound.   Musculoskeletal: Normal range of motion. He exhibits no edema or tenderness.   No clubbing or cyanosis   Neurological: He is alert and oriented to person, place, and time. No cranial nerve deficit. GCS score is 15.   Very mild weakness in the left hand   Skin: Skin is warm and dry. No rash noted. He is not diaphoretic. No erythema.       Laboratory Data:        Recent Labs     08/26/19  0051   WBC 12.2*   RBC 4.18*   HEMOGLOBIN 12.8*   HEMATOCRIT 38.5*   MCV 92.1   MCH 30.6   MCHC 33.2*   RDW 44.6   PLATELETCT 237   MPV 9.8     Recent Labs     08/26/19  0120   SODIUM 133*   POTASSIUM 4.7   CHLORIDE 103   CO2 22   GLUCOSE 91   BUN 17   CREATININE 0.99   CALCIUM 8.6                   Imaging:    I personally viewed the CXR and CT scan images as well as reviewed the radiology interpretation reports.    OUTSIDE IMAGES-DX CHEST   Final Result      OUTSIDE IMAGES-CT HEAD   Final Result      EC-ECHOCARDIOGRAM COMPLETE W/O CONT    (Results Pending)   MR-BRAIN-W/O    (Results Pending)   MR-CERVICAL SPINE-W/O    (Results Pending)   MR-MRA HEAD-W/O    (Results Pending)   MR-MRA NECK-W/O    (Results Pending)       Assessment and Plan:    Stroke (HCC)- (present on admission)  Assessment & Plan  Acute ischemic stroke symptoms characterized by the sudden onset of left upper extremity weakness  CT scan  of the head negative for acute pathology at outside hospital  S/P alteplase at outside hospital  Start high intensity statin  Start aspirin 24 hours after alteplase administration only if repeat imaging does not reveal evidence of intracranial hemorrhage  Check echocardiogram, lipid panel and MRI of the brain  Neuro checks every hour    Type 2 diabetes mellitus, without long-term current use of insulin ([facility])  Assessment & Plan  Check glycohemoglobin  Sliding scale insulin    Primary hypertension  Assessment & Plan  History of primary hypertension  Strict blood pressure control with permissive hypertension up to 180/105    Hypothyroidism  Assessment & Plan  Continue levothyroxine    Hyponatremia- (present on admission)  Assessment & Plan  Hydrate with intravenous fluids  Monitor sodium    Obesity- (present on admission)  Assessment & Plan  Nutrition counseling    This gentleman is critically ill.  He presents with acute stroke symptoms.  He received alteplase.  He requires very close neurological monitoring and strict blood pressure control.  I have assessed and reassessed his neurologic status, blood pressure, hemodynamics and cardiovascular status.  He is at high risk for worsening CNS system dysfunction.    High risk of deterioration and worsening vital organ dysfunction and death without the above critical care interventions.    Thank you for allowing me to participate in the care of this gentleman.  I will continue to follow him with great interest.    Critical Care Time: 40 minutes  [id number]  No time overlap  Time excludes procedures  Discussed with RN, hospitalist    Lee Padilla MD  Pulmonary and Critical Care Medicine

## 2019-08-26 NOTE — THERAPY
Occupational Therapy Contact Note    OT consult received. Pt currently has conflicting orders with 24hr bedrest s/p alteplase/Thrombectomy and Mobilization 3x/day placed simultaneously. Will hold OT eval until bedrest orders are discontinued.    Alanis Golden, OTR/L   Patient called to schedule with Dr. Silvia Tadeo. I explained that Dr. Michel Kc is now with . I provided the new phone number to the patient as he will be following Dr. iSlvia Tadeo.

## 2019-08-26 NOTE — THERAPY
Physical Therapy Contact Note    PT consult received and acknowledged. Per chart review patient s/p TPA and with active bed rest orders. Will hold PT eval until patient able to participate with OOB activity.    Cristin Quiñonez, PT, DPT  [phone]

## 2019-08-26 NOTE — ED TRIAGE NOTES
"Chief Complaint   Patient presents with   • Possible Stroke     TPA administered pta     /68   Pulse 63   Temp 36.8 °C (98.2 °F) (Temporal)   Resp 17   Ht 1.854 m (6' 1\")   Wt 122.5 kg (270 lb)   SpO2 88%     Pt bib [facility] intra hospital transfer from [place] post TPA administration secondary to stroke symptoms. The pt reports he was watching tv, at 2030 only symptoms was sudden onset of L arm paralysis. TPA was administered at 2155, infusion completed at 2255. Flight RN reports at 2330 neuro check NIH was 4 for L arm weakness, repeat exam at 2345 NIH improved to 2 secondary to L arm movement improving. At 0000 when the pt arrived to [facility] ED neuro exam showed increased improvement to L arm movement, NIH 1. Pt is able to touch L finger to nose to RN's finger. Pt reports the L arm has not improved to baseline. The pt is AxOx4, GCS 15.   "

## 2019-08-26 NOTE — ASSESSMENT & PLAN NOTE
MRI showing several small infarcts? 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

## 2019-08-26 NOTE — H&P
Hospital Medicine History & Physical Note    Date of Service  8/26/2019    Primary Care Physician  Wilson Gómez M.D.    Consultants  Neurology    Code Status  Full code    Chief Complaint  Left upper extremity weakness    History of Presenting Illness  64 y.o. male with a past medical history of obesity, chronic neck pain status post cervical fusion, bipolar disorder, hypertension, prediabetes who presented 8/25/2019 with sudden onset of left upper extremity weakness that started at around 8 PM.  Patient was working in the garage when he suddenly noticed weakness of his left upper extremity and was unable to  an object.  He also reported acute on chronic neck pain.  He denies any headache, vision changes, fevers, chills, numbness or incontinence.  He reports a remote history of stroke with transient vision loss and takes a daily baby aspirin.  He reports a family history of stroke in his mother.  He was taken to the ER in [place] where he underwent a CT head without contrast that was negative for process.  The case was discussed with neurology Dr. Quick who recommended administration of TPA.  Since then the patient has noted some improvement of his left upper extremity weakness.    Chest x-ray at outlying facility interpreted by me reveals no acute cardiopulmonary process.  Cervical hardware is noted    Review of Systems  Review of Systems   Constitutional: Negative for chills, diaphoresis and fever.   HENT: Negative for hearing loss and sore throat.    Eyes: Negative for blurred vision.   Respiratory: Negative for cough, sputum production, shortness of breath and wheezing.    Cardiovascular: Negative for chest pain, palpitations and leg swelling.   Gastrointestinal: Negative for abdominal pain, blood in stool, diarrhea, nausea and vomiting.   Genitourinary: Negative for dysuria, flank pain and urgency.   Musculoskeletal: Negative for back pain, joint pain, myalgias and neck pain.   Skin: Negative for rash.    Neurological: Positive for focal weakness. Negative for dizziness, seizures and headaches.   Endo/Heme/Allergies: Does not bruise/bleed easily.   Psychiatric/Behavioral: Negative for suicidal ideas.   All other systems reviewed and are negative.      Past Medical History   has a past medical history of Bipolar 1 disorder (HCC).    Surgical History   has a past surgical history that includes pr polysomnography w cpap (4/6/2017).     Family History  No pertinent family history    Social History   Denies tobacco, alcohol or illicit drug use    Allergies  Allergies   Allergen Reactions   • Lithium        Medications  Aspirin 81 mg daily       Physical Exam  Temp:  [36.8 °C (98.2 °F)] 36.8 °C (98.2 °F)  Pulse:  [57-68] 65  Resp:  [12-20] 17  BP: (109-153)/() 147/63  SpO2:  [88 %-95 %] 95 %    Physical Exam   Constitutional: He is oriented to person, place, and time. He appears well-developed and well-nourished. No distress.   HENT:   Head: Normocephalic and atraumatic.   Mouth/Throat: Oropharynx is clear and moist.   Eyes: Pupils are equal, round, and reactive to light. Conjunctivae are normal. No scleral icterus.   Neck: Normal range of motion. Neck supple.   Cardiovascular: Normal rate, regular rhythm and normal heart sounds.   Pulmonary/Chest: Effort normal and breath sounds normal. No respiratory distress. He has no wheezes. He has no rales.   Abdominal: Soft. Bowel sounds are normal. He exhibits no distension. There is no tenderness. There is no rebound.   Musculoskeletal: Normal range of motion. He exhibits no edema or tenderness.   Lymphadenopathy:     He has no cervical adenopathy.   Neurological: He is alert and oriented to person, place, and time. No cranial nerve deficit. Coordination normal.   Speech intact  No facial droop  Left upper extremity strength 3/5 with sensation intact  Strength and sensation intact in all other extremities   Skin: Skin is warm. No rash noted.   Psychiatric: He has a normal  mood and affect. His behavior is normal.   Nursing note and vitals reviewed.      Laboratory:  Recent Labs     08/26/19 0051   WBC 12.2*   RBC 4.18*   HEMOGLOBIN 12.8*   HEMATOCRIT 38.5*   MCV 92.1   MCH 30.6   MCHC 33.2*   RDW 44.6   PLATELETCT 237   MPV 9.8     Recent Labs     08/26/19  0120   SODIUM 133*   POTASSIUM 4.7   CHLORIDE 103   CO2 22   GLUCOSE 91   BUN 17   CREATININE 0.99   CALCIUM 8.6     Recent Labs     08/26/19  0120   GLUCOSE 91     Recent Labs     08/26/19 0051   APTT 29.4   INR 1.07     No results for input(s): NTPROBNP in the last 72 hours.      Recent Labs     08/26/19 0120   TROPONINT 14       Urinalysis:    No results found     Imaging:  OUTSIDE IMAGES-DX CHEST   Final Result      OUTSIDE IMAGES-CT HEAD   Final Result      EC-ECHOCARDIOGRAM COMPLETE W/O CONT    (Results Pending)   MR-BRAIN-W/O    (Results Pending)   MR-CERVICAL SPINE-W/O    (Results Pending)   MR-MRA HEAD-W/O    (Results Pending)   MR-MRA NECK-W/O    (Results Pending)         Assessment/Plan:  I anticipate this patient will require at least two midnights for appropriate medical management, necessitating inpatient admission.    Stroke (HCC)- (present on admission)  Assessment & Plan  Sudden onset of left upper extremity weakness.  Status post TPA outlying facility.   Patient will be admitted to the ICU with neurochecks per TPA protocol  Patient will be placed on continuous cardiac monitoring to evaluate for any arrhythmias  I will order MRI brain, MRA head and neck  I will order MRI cervical spine to evaluate for possible cervical stenosis  Check 2-D echo  Patient will be started on full dose aspirin 24 hours after TPA administration  Patient is a started on high intensity atorvastatin  Strict blood pressure control with IV hydralazine for SBP greater than 180/105  IV fluid hydration with normal saline  Check TSH, lipid panel and hemoglobin A1c  PTOT and ST evaluation  Dr. Quick from neurology was consulted, appreciate  recommendations.  He requested that we consult the morning neurologist to follow the patient          Hyponatremia- (present on admission)  Assessment & Plan  IV fluid hydration with normal saline  Monitor BMP and assess response      Leukocytosis- (present on admission)  Assessment & Plan  Likely reactive  Monitor CBC and vitals    Obesity- (present on admission)  Assessment & Plan  Body mass index is 35.62 kg/m².  Patient has been counseled on diet and lifestyle modifications        VTE prophylaxis: scd

## 2019-08-27 ENCOUNTER — APPOINTMENT (OUTPATIENT)
Dept: CARDIOLOGY | Facility: MEDICAL CENTER | Age: 64
DRG: 041 | End: 2019-08-27
Attending: STUDENT IN AN ORGANIZED HEALTH CARE EDUCATION/TRAINING PROGRAM
Payer: COMMERCIAL

## 2019-08-27 LAB
ANION GAP SERPL CALC-SCNC: 8 MMOL/L (ref 0–11.9)
BASOPHILS # BLD AUTO: 0.5 % (ref 0–1.8)
BASOPHILS # BLD: 0.07 K/UL (ref 0–0.12)
BUN SERPL-MCNC: 15 MG/DL (ref 8–22)
CALCIUM SERPL-MCNC: 9.7 MG/DL (ref 8.5–10.5)
CHLORIDE SERPL-SCNC: 104 MMOL/L (ref 96–112)
CO2 SERPL-SCNC: 22 MMOL/L (ref 20–33)
CREAT SERPL-MCNC: 0.82 MG/DL (ref 0.5–1.4)
EOSINOPHIL # BLD AUTO: 0.42 K/UL (ref 0–0.51)
EOSINOPHIL NFR BLD: 3.2 % (ref 0–6.9)
ERYTHROCYTE [DISTWIDTH] IN BLOOD BY AUTOMATED COUNT: 43.6 FL (ref 35.9–50)
GLUCOSE SERPL-MCNC: 85 MG/DL (ref 65–99)
HCT VFR BLD AUTO: 41.9 % (ref 42–52)
HGB BLD-MCNC: 14.5 G/DL (ref 14–18)
IMM GRANULOCYTES # BLD AUTO: 0.06 K/UL (ref 0–0.11)
IMM GRANULOCYTES NFR BLD AUTO: 0.5 % (ref 0–0.9)
LYMPHOCYTES # BLD AUTO: 2.7 K/UL (ref 1–4.8)
LYMPHOCYTES NFR BLD: 20.7 % (ref 22–41)
MCH RBC QN AUTO: 32 PG (ref 27–33)
MCHC RBC AUTO-ENTMCNC: 34.6 G/DL (ref 33.7–35.3)
MCV RBC AUTO: 92.5 FL (ref 81.4–97.8)
MONOCYTES # BLD AUTO: 0.89 K/UL (ref 0–0.85)
MONOCYTES NFR BLD AUTO: 6.8 % (ref 0–13.4)
NEUTROPHILS # BLD AUTO: 8.92 K/UL (ref 1.82–7.42)
NEUTROPHILS NFR BLD: 68.3 % (ref 44–72)
NRBC # BLD AUTO: 0 K/UL
NRBC BLD-RTO: 0 /100 WBC
PLATELET # BLD AUTO: 221 K/UL (ref 164–446)
PMV BLD AUTO: 9.5 FL (ref 9–12.9)
POTASSIUM SERPL-SCNC: 4.5 MMOL/L (ref 3.6–5.5)
RBC # BLD AUTO: 4.53 M/UL (ref 4.7–6.1)
SODIUM SERPL-SCNC: 134 MMOL/L (ref 135–145)
TSH SERPL DL<=0.005 MIU/L-ACNC: 0.08 UIU/ML (ref 0.38–5.33)
WBC # BLD AUTO: 13.1 K/UL (ref 4.8–10.8)

## 2019-08-27 PROCEDURE — 770020 HCHG ROOM/CARE - TELE (206)

## 2019-08-27 PROCEDURE — 97161 PT EVAL LOW COMPLEX 20 MIN: CPT

## 2019-08-27 PROCEDURE — 92526 ORAL FUNCTION THERAPY: CPT

## 2019-08-27 PROCEDURE — 85025 COMPLETE CBC W/AUTO DIFF WBC: CPT

## 2019-08-27 PROCEDURE — 700102 HCHG RX REV CODE 250 W/ 637 OVERRIDE(OP): Performed by: HOSPITALIST

## 2019-08-27 PROCEDURE — 99222 1ST HOSP IP/OBS MODERATE 55: CPT | Mod: GC | Performed by: PSYCHIATRY & NEUROLOGY

## 2019-08-27 PROCEDURE — 80048 BASIC METABOLIC PNL TOTAL CA: CPT

## 2019-08-27 PROCEDURE — 700102 HCHG RX REV CODE 250 W/ 637 OVERRIDE(OP): Performed by: INTERNAL MEDICINE

## 2019-08-27 PROCEDURE — 99233 SBSQ HOSP IP/OBS HIGH 50: CPT | Performed by: HOSPITALIST

## 2019-08-27 PROCEDURE — 700101 HCHG RX REV CODE 250

## 2019-08-27 PROCEDURE — A9270 NON-COVERED ITEM OR SERVICE: HCPCS | Performed by: INTERNAL MEDICINE

## 2019-08-27 PROCEDURE — A9270 NON-COVERED ITEM OR SERVICE: HCPCS | Performed by: HOSPITALIST

## 2019-08-27 PROCEDURE — 99233 SBSQ HOSP IP/OBS HIGH 50: CPT | Performed by: INTERNAL MEDICINE

## 2019-08-27 PROCEDURE — 97165 OT EVAL LOW COMPLEX 30 MIN: CPT

## 2019-08-27 PROCEDURE — 99222 1ST HOSP IP/OBS MODERATE 55: CPT | Mod: GC | Performed by: INTERNAL MEDICINE

## 2019-08-27 RX ORDER — HYDROCHLOROTHIAZIDE 25 MG/1
25 TABLET ORAL
Status: DISCONTINUED | OUTPATIENT
Start: 2019-08-27 | End: 2019-08-28 | Stop reason: HOSPADM

## 2019-08-27 RX ORDER — TRAMADOL HYDROCHLORIDE 50 MG/1
50 TABLET ORAL EVERY 8 HOURS PRN
Status: DISCONTINUED | OUTPATIENT
Start: 2019-08-27 | End: 2019-08-28 | Stop reason: HOSPADM

## 2019-08-27 RX ORDER — BENAZEPRIL HYDROCHLORIDE 20 MG/1
10 TABLET ORAL DAILY
Status: DISCONTINUED | OUTPATIENT
Start: 2019-08-28 | End: 2019-08-28 | Stop reason: HOSPADM

## 2019-08-27 RX ORDER — BENAZEPRIL HYDROCHLORIDE 10 MG/1
5 TABLET ORAL ONCE
Status: COMPLETED | OUTPATIENT
Start: 2019-08-27 | End: 2019-08-27

## 2019-08-27 RX ORDER — LIDOCAINE HYDROCHLORIDE 20 MG/ML
INJECTION, SOLUTION INFILTRATION; PERINEURAL
Status: COMPLETED
Start: 2019-08-27 | End: 2019-08-27

## 2019-08-27 RX ORDER — CLOPIDOGREL BISULFATE 75 MG/1
75 TABLET ORAL DAILY
Status: DISCONTINUED | OUTPATIENT
Start: 2019-08-27 | End: 2019-08-28 | Stop reason: HOSPADM

## 2019-08-27 RX ORDER — LIDOCAINE HYDROCHLORIDE AND EPINEPHRINE 10; 10 MG/ML; UG/ML
INJECTION, SOLUTION INFILTRATION; PERINEURAL
Status: COMPLETED
Start: 2019-08-27 | End: 2019-08-27

## 2019-08-27 RX ADMIN — VENLAFAXINE HYDROCHLORIDE 150 MG: 75 CAPSULE, EXTENDED RELEASE ORAL at 06:56

## 2019-08-27 RX ADMIN — CLOPIDOGREL BISULFATE 75 MG: 75 TABLET ORAL at 12:48

## 2019-08-27 RX ADMIN — TAMSULOSIN HYDROCHLORIDE 0.4 MG: 0.4 CAPSULE ORAL at 09:12

## 2019-08-27 RX ADMIN — LEVOTHYROXINE SODIUM 200 MCG: 25 TABLET ORAL at 06:56

## 2019-08-27 RX ADMIN — BENAZEPRIL HYDROCHLORIDE 5 MG: 10 TABLET ORAL at 09:13

## 2019-08-27 RX ADMIN — ATORVASTATIN CALCIUM 80 MG: 80 TABLET, FILM COATED ORAL at 17:37

## 2019-08-27 RX ADMIN — OMEPRAZOLE 20 MG: 20 CAPSULE, DELAYED RELEASE ORAL at 06:55

## 2019-08-27 RX ADMIN — HYDROCHLOROTHIAZIDE 25 MG: 25 TABLET ORAL at 09:12

## 2019-08-27 RX ADMIN — VENLAFAXINE 50 MG: 50 TABLET ORAL at 20:09

## 2019-08-27 RX ADMIN — BENAZEPRIL HYDROCHLORIDE 5 MG: 10 TABLET ORAL at 06:55

## 2019-08-27 RX ADMIN — VITAMIN D, TAB 1000IU (100/BT) 1000 UNITS: 25 TAB at 06:55

## 2019-08-27 RX ADMIN — LIDOCAINE HYDROCHLORIDE,EPINEPHRINE BITARTRATE: 10; .01 INJECTION, SOLUTION INFILTRATION; PERINEURAL at 11:15

## 2019-08-27 RX ADMIN — ASPIRIN 325 MG: 325 TABLET, FILM COATED ORAL at 09:15

## 2019-08-27 RX ADMIN — DIVALPROEX SODIUM 1000 MG: 500 TABLET, DELAYED RELEASE ORAL at 06:56

## 2019-08-27 RX ADMIN — DIVALPROEX SODIUM 1000 MG: 500 TABLET, DELAYED RELEASE ORAL at 17:37

## 2019-08-27 ASSESSMENT — LIFESTYLE VARIABLES
EVER_SMOKED: YES
EVER_SMOKED: YES

## 2019-08-27 ASSESSMENT — COGNITIVE AND FUNCTIONAL STATUS - GENERAL
DAILY ACTIVITIY SCORE: 24
SUGGESTED CMS G CODE MODIFIER MOBILITY: CH
MOBILITY SCORE: 24
SUGGESTED CMS G CODE MODIFIER DAILY ACTIVITY: CH

## 2019-08-27 ASSESSMENT — ENCOUNTER SYMPTOMS
SORE THROAT: 0
FOCAL WEAKNESS: 0
ABDOMINAL PAIN: 0
MYALGIAS: 0
VOMITING: 0
DIZZINESS: 0
FEVER: 0
DIARRHEA: 0
DOUBLE VISION: 0
CHILLS: 0
SPUTUM PRODUCTION: 0
STRIDOR: 0
WEAKNESS: 1
BLURRED VISION: 0
HEADACHES: 0
COUGH: 0
BACK PAIN: 0
PALPITATIONS: 0
LOSS OF CONSCIOUSNESS: 0
FOCAL WEAKNESS: 1
NAUSEA: 0
SHORTNESS OF BREATH: 0
SENSORY CHANGE: 0

## 2019-08-27 ASSESSMENT — ACTIVITIES OF DAILY LIVING (ADL): TOILETING: INDEPENDENT

## 2019-08-27 ASSESSMENT — PATIENT HEALTH QUESTIONNAIRE - PHQ9
SUM OF ALL RESPONSES TO PHQ9 QUESTIONS 1 AND 2: 6
9. THOUGHTS THAT YOU WOULD BE BETTER OFF DEAD, OR OF HURTING YOURSELF: NOT AT ALL
7. TROUBLE CONCENTRATING ON THINGS, SUCH AS READING THE NEWSPAPER OR WATCHING TELEVISION: SEVERAL DAYS
8. MOVING OR SPEAKING SO SLOWLY THAT OTHER PEOPLE COULD HAVE NOTICED. OR THE OPPOSITE, BEING SO FIGETY OR RESTLESS THAT YOU HAVE BEEN MOVING AROUND A LOT MORE THAN USUAL: SEVERAL DAYS
8. MOVING OR SPEAKING SO SLOWLY THAT OTHER PEOPLE COULD HAVE NOTICED. OR THE OPPOSITE, BEING SO FIGETY OR RESTLESS THAT YOU HAVE BEEN MOVING AROUND A LOT MORE THAN USUAL: SEVERAL DAYS
5. POOR APPETITE OR OVEREATING: NOT AT ALL
3. TROUBLE FALLING OR STAYING ASLEEP OR SLEEPING TOO MUCH: NOT AT ALL
4. FEELING TIRED OR HAVING LITTLE ENERGY: NOT AT ALL
3. TROUBLE FALLING OR STAYING ASLEEP OR SLEEPING TOO MUCH: NOT AT ALL
2. FEELING DOWN, DEPRESSED, IRRITABLE, OR HOPELESS: NEARLY EVERY DAY
6. FEELING BAD ABOUT YOURSELF - OR THAT YOU ARE A FAILURE OR HAVE LET YOURSELF OR YOUR FAMILY DOWN: SEVERAL DAYS
SUM OF ALL RESPONSES TO PHQ9 QUESTIONS 1 AND 2: 4
SUM OF ALL RESPONSES TO PHQ QUESTIONS 1-9: 7
1. LITTLE INTEREST OR PLEASURE IN DOING THINGS: NEARLY EVERY DAY
2. FEELING DOWN, DEPRESSED, IRRITABLE, OR HOPELESS: MORE THAN HALF THE DAYS
SUM OF ALL RESPONSES TO PHQ QUESTIONS 1-9: 9
4. FEELING TIRED OR HAVING LITTLE ENERGY: NOT AT ALL
9. THOUGHTS THAT YOU WOULD BE BETTER OFF DEAD, OR OF HURTING YOURSELF: NOT AT ALL
5. POOR APPETITE OR OVEREATING: NOT AT ALL
7. TROUBLE CONCENTRATING ON THINGS, SUCH AS READING THE NEWSPAPER OR WATCHING TELEVISION: SEVERAL DAYS
6. FEELING BAD ABOUT YOURSELF - OR THAT YOU ARE A FAILURE OR HAVE LET YOURSELF OR YOUR FAMILY DOWN: SEVERAL DAYS
1. LITTLE INTEREST OR PLEASURE IN DOING THINGS: MORE THAN HALF THE DAYS

## 2019-08-27 ASSESSMENT — GAIT ASSESSMENTS
DISTANCE (FEET): 200
GAIT LEVEL OF ASSIST: SUPERVISED

## 2019-08-27 NOTE — CARE PLAN
Problem: Risk for Infection, Impaired Wound Healing  Goal: Remain free from signs and symptoms of infection  Intervention: Infection prevention measures  Note:   Standard precautions in place      Problem: Bowel/Gastric:  Goal: Normal bowel function is maintained or improved  Outcome: MET  Note:   Pt to bathroom on monitor. Successful BM     Problem: Risk for Impaired Mobility--Activity Intolerance  Goal: Mobilize and/or Transfer Safely with [facility] [place]  Note:   Pt ambulating in unit.  Min assistance while pushing wheelchair.  Safety reminders given.

## 2019-08-27 NOTE — CONSULTS
Cardiology Consult    CC: Acute CVA of right frontal & temporal lobes     HPI:  A 64 y.o. male with PMHx of chronic neck pain status post cervical fusion, bipolar disorder, hypertension, obesity who presented 8/25/2019 with sudden onset of left upper extremity weakness . Pt stated that he cannot control his left hand anymore. He reports a family history of CVA in his mother.  He was taken to the ER in [place] where he underwent a CT head without contrast that was negative for process.  Pt was within tPA window , so tPA was started & transferred to [facility].   MRI brain showed  Multifocal small cortical infarcts in the right frontal, parietal and temporal lobes.  US carotids showed <50% stenosis in B/L ICA.   ECHO with bubble study showed EF 65% with no PFO, no valve abnormalities, RVSP 20 mmHg.    Cardiology was consulted for loop recorder for evlauation of AFib.   Per pt & his wife, he has been drinking 6 cans of energy drinks per day for years.       MEDICATIONS:    Current Facility-Administered Medications:   •  tramadol (ULTRAM) 50 MG tablet 50 mg, 50 mg, Oral, Q8HRS PRN, Jair Arnett Jr., D.O.  •  [START ON 8/28/2019] benazepril (LOTENSIN) tablet 10 mg, 10 mg, Oral, DAILY, Frankie Howell D.O.  •  hydroCHLOROthiazide (HYDRODIURIL) tablet 25 mg, 25 mg, Oral, Q DAY, RENNY CantrellOBlank, 25 mg at 08/27/19 0912  •  clopidogrel (PLAVIX) tablet 75 mg, 75 mg, Oral, DAILY, Frankie Howell D.O.  •  [START ON 8/28/2019] aspirin EC (ECOTRIN) tablet 81 mg, 81 mg, Oral, DAILY, Jair Arnett Jr., D.O.  •  senna-docusate (PERICOLACE or SENOKOT S) 8.6-50 MG per tablet 2 Tab, 2 Tab, Oral, BID, 2 Tab at 08/26/19 1755 **AND** polyethylene glycol/lytes (MIRALAX) PACKET 1 Packet, 1 Packet, Oral, QDAY PRN **AND** magnesium hydroxide (MILK OF MAGNESIA) suspension 30 mL, 30 mL, Oral, QDAY PRN **AND** bisacodyl (DULCOLAX) suppository 10 mg, 10 mg, Rectal, QDAY PRN, Frandy Singh M.D.  •  Respiratory Care  per Protocol, , Nebulization, Continuous RT, Frandy Singh M.D.  •  acetaminophen (TYLENOL) tablet 650 mg, 650 mg, Oral, Q6HRS PRN, Frandy Singh M.D.  •  ondansetron (ZOFRAN) syringe/vial injection 4 mg, 4 mg, Intravenous, Q4HRS PRN, Frandy Singh M.D.  •  ondansetron (ZOFRAN ODT) dispertab 4 mg, 4 mg, Oral, Q4HRS PRN, Frandy Singh M.D.  •  promethazine (PHENERGAN) tablet 12.5-25 mg, 12.5-25 mg, Oral, Q4HRS PRN, Frandy Singh M.D.  •  promethazine (PHENERGAN) suppository 12.5-25 mg, 12.5-25 mg, Rectal, Q4HRS PRN, Frandy Singh M.D.  •  prochlorperazine (COMPAZINE) injection 5-10 mg, 5-10 mg, Intravenous, Q4HRS PRN, Frandy Singh M.D.  •  labetalol (NORMODYNE,TRANDATE) injection 10 mg, 10 mg, Intravenous, Q4HRS PRN **OR** hydrALAZINE (APRESOLINE) injection 10 mg, 10 mg, Intravenous, Q2HRS PRN, Frandy Singh M.D.  •  atorvastatin (LIPITOR) tablet 80 mg, 80 mg, Oral, Q EVENING, Frandy Singh M.D., 80 mg at 08/26/19 1754  •  divalproex (DEPAKOTE) delayed-release tablet 1,000 mg, 1,000 mg, Oral, BID, Jair Arnett Jr., D.O., 1,000 mg at 08/27/19 0656  •  levothyroxine (SYNTHROID) tablet 200 mcg, 200 mcg, Oral, AM ES, Jair Arnett Jr., D.O., 200 mcg at 08/27/19 0656  •  omeprazole (PRILOSEC) capsule 20 mg, 20 mg, Oral, DAILY, Jair Arnett Jr., D.O., 20 mg at 08/27/19 0655  •  venlafaxine (EFFEXOR) 50 mg, 50 mg, Oral, QHS, Jair Arnett Jr., D.O., 50 mg at 08/26/19 2203  •  venlafaxine XR (EFFEXOR XR) capsule 150 mg, 150 mg, Oral, DAILY, Jair Arnett Jr., D.O., 150 mg at 08/27/19 0656  •  vitamin D (cholecalciferol) tablet 1,000 Units, 1,000 Units, Oral, DAILY, Jair Arnett Jr., D.O., 1,000 Units at 08/27/19 0655  •  tamsulosin (FLOMAX) capsule 0.4 mg, 0.4 mg, Oral, AFTER BREAKFAST, Jair Arnett Jr., D.O., 0.4 mg at 08/27/19 0912    ALLERGIES:   Mr. Ingram  is allergic to lithium.     SURGERIES:  Mr. Ingram   has a past surgical history that includes pr polysomnography w cpap (4/6/2017).     MEDICAL  "ILLNESSES:  Mr. Ingram   has a past medical history of Bipolar 1 disorder (HCC).     SOCIAL HISTORY:  Mr. Ingram   reports that he quit smoking about 20 years ago. His smoking use included cigarettes. He has never used smokeless tobacco.     FAMILY HISTORY:  Mr.'s Ingram  family history is not on file.      ROS:    Constitutional: Patient has had no fevers or chills or fatigue. Patient has has no significant weight change.  Eyes: Patient had no visual changes or vision loss.  ENT: Patient denies any sore throat or allergic rhinitis.  Respiratory: Patient has had no cough or sputum production. Also, no hemoptysis.  Cardiovascular: As noted above.  GI: Patient denies any nausea, vomiting, or diarrhea. Patient has had no hematemesis or melena.  : Patient denies any urinary urgency, frequency, or dysuria. Patient has noted no hematuria.  Orthopedic: Patient has no particular problem with arthritis. Patient is able to walk and exercise without difficulty.  Neurologic: Patient has had no focal numbness or weakness.   Psychiatric: Patient denies any difficulty with anxiety or depression.  Endocrine: Patient denies any history of thyroid disorder or diabetes. Patient denies any heat or cold intolerance. In addition, patient denies any polydipsia or polyuria.  Hematologic: Patient has had no easy bruising or bleeding. Patient denies any history of anemia.  Skin: Patient denies any unusual rashes or skin lesions.  Allergic/immunologic: Patient denies any difficulty with hayfever or other environmental allergens. Patient denies any food allergies.       PHYSICAL EXAM:    /92   Pulse 69   Temp 36.3 °C (97.4 °F) (Temporal)   Resp 18   Ht 1.854 m (6' 1\")   Wt 120 kg (264 lb 8.8 oz)   SpO2 94%   BMI 34.90 kg/m²      General: 64-year-old male in bed no acute distress.  Cardio: Normal S1/S2. No peripheral edema.   Pulm: CTAX2. No respiratory distress.   Skin: Warm, dry, no rashes or lesions   Psychiatric: Appropriate " affect. No active psychosis.  HEENT: Atraumatic head, normal sclera and conjunctiva, moist oral mucosa. No lid lag.  Abdomen: Soft, non tender. No masses or hepatosplenomegaly.     Neurologic:  Mental Status:  AAOx4. Able to follow commands/cross midline. Speech fluent/nondysarthric. Language functions intact. No neglect/apraxia.  Cranial Nerves:  PERRL. EOMi. Face symmetric, palate/tongue midline. Visual fields full to confrontation. Facial sensation intact.   Motor:  Normal muscle tone and bulk. Strength is 5/5 throughout with the exception of 4/5 in the left upper extremity.  Reflexes:  2/4 throughout. Flexor plantar responses bilaterally.   Coordination: Finger-to-nose without ataxia  Sensation: Normal to light touch throughout  Gait/Station: Deferred.       Lab Results   Component Value Date/Time    CHOLSTRLTOT 164 08/26/2019 01:20 AM     (H) 08/26/2019 01:20 AM    HDL 26 (A) 08/26/2019 01:20 AM    TRIGLYCERIDE 184 (H) 08/26/2019 01:20 AM       Lab Results   Component Value Date/Time    SODIUM 134 (L) 08/27/2019 05:00 AM    POTASSIUM 4.5 08/27/2019 05:00 AM    CHLORIDE 104 08/27/2019 05:00 AM    CO2 22 08/27/2019 05:00 AM    GLUCOSE 85 08/27/2019 05:00 AM    BUN 15 08/27/2019 05:00 AM    CREATININE 0.82 08/27/2019 05:00 AM     No results found for: ALKPHOSPHAT, ASTSGOT, ALTSGPT, TBILIRUBIN   No results found for: BNPBTYPENAT    Patient Active Problem List    Diagnosis Date Noted   • Stroke (HCC) 08/26/2019     Priority: High   • Leukocytosis 08/26/2019     Priority: Medium   • Primary hypertension 08/26/2019     Priority: Medium   • Type 2 diabetes mellitus, without long-term current use of insulin (HCC) 08/26/2019     Priority: Medium   • Obesity 08/26/2019     Priority: Low   • Hyponatremia 08/26/2019     Priority: Low   • Hypothyroidism 08/26/2019     Priority: Low         ASSESSMENT & PLAN:  # Acute CVA of right frontal & temporal lobes   # Left upper extremity weakness   - presented on  8/25/2019  with sudden onset of left upper extremity weakness  - CT head at [facility] showed no ICH   - s/p tPA  - EKG : SR   - US carotids showed <50% stenosis in B/L ICA.   -ECHO with bubble study showed EF 65% with no PFO, no valve abnormalities, RVSP 20 mmHg.    - Cardiology was consulted for loop recorder for evlauation of AFib.   - Loop recorder ordered , since US carotids did not show significant level of stenosis   - Pt is on ASA & Plavix   - If loop recorder showed A fib, pt will need anticoagulation since his MAXWELL VASc is 3  - Thank you for the consult. Will follow the pt.

## 2019-08-27 NOTE — PROGRESS NOTES
Hospital Neurology Progress Note:     Interval History: No acute events overnight.  No complaints today.  States that his arm is better.    Objective:   Vitals:    08/27/19 0730 08/27/19 0800 08/27/19 0900 08/27/19 1000   BP: 140/98 151/95 144/90 130/92   Pulse: 67 65 70 69   Resp: 20 18 14 18   Temp:  36.3 °C (97.4 °F)     TempSrc:  Temporal     SpO2: 95% 96% 94%    Weight:       Height:           Labs:     Lab Results   Component Value Date/Time    PROTHROMBTM 14.2 08/26/2019 12:51 AM    INR 1.07 08/26/2019 12:51 AM      Lab Results   Component Value Date/Time    WBC 13.1 (H) 08/27/2019 05:00 AM    RBC 4.53 (L) 08/27/2019 05:00 AM    HEMOGLOBIN 14.5 08/27/2019 05:00 AM    HEMATOCRIT 41.9 (L) 08/27/2019 05:00 AM    MCV 92.5 08/27/2019 05:00 AM    MCH 32.0 08/27/2019 05:00 AM    MCHC 34.6 08/27/2019 05:00 AM    MPV 9.5 08/27/2019 05:00 AM    NEUTSPOLYS 68.30 08/27/2019 05:00 AM    LYMPHOCYTES 20.70 (L) 08/27/2019 05:00 AM    MONOCYTES 6.80 08/27/2019 05:00 AM    EOSINOPHILS 3.20 08/27/2019 05:00 AM    BASOPHILS 0.50 08/27/2019 05:00 AM      Lab Results   Component Value Date/Time    SODIUM 134 (L) 08/27/2019 05:00 AM    POTASSIUM 4.5 08/27/2019 05:00 AM    CHLORIDE 104 08/27/2019 05:00 AM    CO2 22 08/27/2019 05:00 AM    GLUCOSE 85 08/27/2019 05:00 AM    BUN 15 08/27/2019 05:00 AM    CREATININE 0.82 08/27/2019 05:00 AM      Lab Results   Component Value Date/Time    CHOLSTRLTOT 164 08/26/2019 01:20 AM     (H) 08/26/2019 01:20 AM    HDL 26 (A) 08/26/2019 01:20 AM    TRIGLYCERIDE 184 (H) 08/26/2019 01:20 AM            Imaging/Testing:   MRI of the brain without contrast was personally reviewed which shows multiple areas of infarction in the right frontal, parietal, and temporal lobes.    MRI cervical spine reviewed in chart.    Echocardiogram reviewed in chart.  No evidence of enlarged left atrium or PFO/VSD/ASD    Carotid ultrasound reviewed in chart without evidence of significant stenosis.    Physical Exam:      General: 64-year-old male in bed no acute distress.  Cardio: Normal S1/S2. No peripheral edema.   Pulm: CTAX2. No respiratory distress.   Skin: Warm, dry, no rashes or lesions   Psychiatric: Appropriate affect. No active psychosis.  HEENT: Atraumatic head, normal sclera and conjunctiva, moist oral mucosa. No lid lag.  Abdomen: Soft, non tender. No masses or hepatosplenomegaly.    Neurologic:  Mental Status:  AAOx4. Able to follow commands/cross midline. Speech fluent/nondysarthric. Language functions intact. No neglect/apraxia.  Cranial Nerves:  PERRL. EOMi. Face symmetric, palate/tongue midline. Visual fields full to confrontation. Facial sensation intact.   Motor:  Normal muscle tone and bulk. Strength is 5/5 throughout with the exception of 4/5 in the left upper extremity.  Reflexes:  2/4 throughout. Flexor plantar responses bilaterally.   Coordination: Finger-to-nose without ataxia  Sensation: Normal to light touch throughout  Gait/Station: Deferred.    Assessment/Plan:    64-year-old male with history of hypertension bipolar disorder, chronic neck pain status post cervical fusion in 2009 presenting with left arm weakness status post TPA administration.  Symptoms improved with TPA administration and currently his NIH stroke scale is 1 for left upper extremity drift.  MRI shows multiple infarctions in the right hemisphere in the frontal parietal and temporal lobes.  This finding would be concerning for carotid stenosis however ultrasound did not reveal this to be the case.  In this regard, other possibilities include cardioembolism with a potential thrombus that may have broken off and scattered throughout the right hemisphere.    Plan:  1.  Recommend dual antiplatelet therapy with aspirin 81 mg and Plavix 75 mg daily for 21 days.  Afterwards, aspirin 81 mg daily.  2.  Recommend loop recorder  3.  .  On statin.  4.  Hemoglobin A1c 5.2.  At goal.  5.  Outpatient systolic blood pressure goal less than  130.  6.  Physical occupational therapy as needed.  7.  Neurology signing off for now.  Please call with any further questions or concerns.  8.  Plan discussed with consulting physician and patient's nurse.     Kyle Sandra M.D., Diplomat of the American Board of Psychiatry and Neurology  Diplomat of [facility]   Assistant Clinical Professor, [facility] Neurology Consultant

## 2019-08-27 NOTE — THERAPY
"Physical Therapy Evaluation completed.   Bed Mobility:  Supine to Sit: (NT, in chair upon entry and exit, appears capable)  Transfers: Sit to Stand: Supervised  Gait: Level Of Assist: Supervised with No Equipment Needed       Plan of Care: Patient with no further skilled PT needs in the acute care setting at this time  Discharge Recommendations: Equipment: No Equipment Needed. Post-acute therapy: see below.    See \"Rehab Therapy-Acute\" Patient Summary Report for complete documentation.    Patient is a pleasant 65 YO male that was admitted 8/26 from outside facility following complaints of stroke like symptoms including LUE weakness. He received TPA at outside facility. MRI revealed multifocal small infarcts to R frontal, parietal, and temporal lobes. PMHx significant for HTN, bipolar disorder, and chronic neck pain s/p fusion. He ambulated approximately 200ft without AD and supervision, no LOB. He reported no concerns regarding mobility or returning home. Provided education regarding return to normal activity and signs and symptoms of stroke. He appears to be near baseline functional mobility and is at safe level for DC home. No further acute PT needs.  "

## 2019-08-27 NOTE — PROGRESS NOTES
"Critical Care Progress Note    Date of admission  8/25/2019    Chief Complaint  64 y.o. male admitted 8/25/2019 with CVA    Hospital Course    \"I was kindly asked to see and evaluate Jeremi Ingram, a 64 y.o. male for evaluation and management of the above problem.     This gentleman has a history of a prior stroke involving only part of his visual field in his right eye, hypertension, diabetes mellitus type 2 which is diet controlled, bipolar disorder, chronic neck pain and hypothyroidism.  This evening at approximately 2030 hrs. he had the sudden onset of left arm paralysis.  He presented to an outside hospital.  CT scan of the head showed no acute pathology.  He was felt to be a candidate for alteplase and this was administered.  He was transferred to [facility] for subspecialty care.  He did not have any acute visual changes, headache or speech difficulties.  He is right-handed.\"      Interval Problem Update  Reviewed last 24 hour events:   - NAEON   - Neuro: AOx4, improved LUE coordination   - HR: 50s-60s   - SBP: 120s-150s   - GI: tolerating diet   - UOP: adequate   - Coelho: no   - Tm: afeb   - Lines: PIV   - PPx: GI diet, DVT Lovenox/SCDs/ambulation   - RA   - CXR (personally reviewed and compared to prior): no new   - <50% carotid stenosis   - MRI with right cortical infarcts    Review of Systems  Review of Systems   Constitutional: Negative for chills and fever.   Respiratory: Negative for cough, sputum production, shortness of breath and stridor.    Cardiovascular: Negative for chest pain.   Gastrointestinal: Negative for abdominal pain, nausea and vomiting.   Genitourinary: Negative for dysuria.   Musculoskeletal: Negative for myalgias.   Skin: Negative for rash.   Neurological: Positive for weakness. Negative for dizziness, sensory change and focal weakness.        Vital Signs for last 24 hours   Temp:  [36.8 °C (98.3 °F)-37.3 °C (99.2 °F)] 36.8 °C (98.3 °F)  Pulse:  [54-75] 58  Resp:  [9-39] 9  BP: " (118-167)/() 118/80  SpO2:  [90 %-96 %] 93 %    Hemodynamic parameters for last 24 hours       Respiratory Information for the last 24 hours       Physical Exam   Physical Exam   Constitutional: He is oriented to person, place, and time. He appears well-developed and well-nourished.   HENT:   Right Ear: External ear normal.   Left Ear: External ear normal.   Mouth/Throat: Oropharynx is clear and moist.   Eyes: Pupils are equal, round, and reactive to light. Conjunctivae and EOM are normal. No scleral icterus.   Neck: Neck supple. No JVD present. No tracheal deviation present.   Cardiovascular: Normal rate, regular rhythm and intact distal pulses.   Pulmonary/Chest: Effort normal and breath sounds normal. No respiratory distress.   Abdominal: Soft. Bowel sounds are normal. He exhibits no distension.   Musculoskeletal: Normal range of motion. He exhibits no edema or tenderness.   Neurological: He is alert and oriented to person, place, and time. No cranial nerve deficit. He exhibits normal muscle tone. Coordination normal.   Skin: Skin is warm and dry.   Psychiatric: He has a normal mood and affect.   Nursing note and vitals reviewed.      Medications  Current Facility-Administered Medications   Medication Dose Route Frequency Provider Last Rate Last Dose   • senna-docusate (PERICOLACE or SENOKOT S) 8.6-50 MG per tablet 2 Tab  2 Tab Oral BID Frandy Singh M.D.   2 Tab at 08/26/19 1755    And   • polyethylene glycol/lytes (MIRALAX) PACKET 1 Packet  1 Packet Oral QDAY PRN Frandy Singh M.D.        And   • magnesium hydroxide (MILK OF MAGNESIA) suspension 30 mL  30 mL Oral QDAY PRN Frandy Singh M.D.        And   • bisacodyl (DULCOLAX) suppository 10 mg  10 mg Rectal QDAY PRN Frandy Singh M.D.       • Respiratory Care per Protocol   Nebulization Continuous RT Frandy Singh M.D.       • acetaminophen (TYLENOL) tablet 650 mg  650 mg Oral Q6HRS PRN Frandy Singh M.D.       • ondansetron (ZOFRAN) syringe/vial injection 4 mg   4 mg Intravenous Q4HRS PRN Frandy Singh M.D.       • ondansetron (ZOFRAN ODT) dispertab 4 mg  4 mg Oral Q4HRS PRN Frandy Singh M.D.       • promethazine (PHENERGAN) tablet 12.5-25 mg  12.5-25 mg Oral Q4HRS PRN Frandy Singh M.D.       • promethazine (PHENERGAN) suppository 12.5-25 mg  12.5-25 mg Rectal Q4HRS PRN Frandy Singh M.D.       • prochlorperazine (COMPAZINE) injection 5-10 mg  5-10 mg Intravenous Q4HRS PRN Frandy Singh M.D.       • labetalol (NORMODYNE,TRANDATE) injection 10 mg  10 mg Intravenous Q4HRS PRN Frandy Singh M.D.        Or   • hydrALAZINE (APRESOLINE) injection 10 mg  10 mg Intravenous Q2HRS PRN Frandy Singh M.D.       • atorvastatin (LIPITOR) tablet 80 mg  80 mg Oral Q EVENING Frandy Singh M.D.   80 mg at 08/26/19 1754   • aspirin (ASA) tablet 325 mg  325 mg Oral DAILY Frandy Singh M.D.   Stopped at 08/27/19 0600    Or   • aspirin (ASA) chewable tab 324 mg  324 mg Oral DAILY Frandy Singh M.D.        Or   • aspirin (ASA) suppository 300 mg  300 mg Rectal DAILY Frandy Singh M.D.       • benazepril (LOTENSIN) tablet 5 mg  5 mg Oral DAILY Jair Arnett Jr., D.O.   5 mg at 08/26/19 1543   • divalproex (DEPAKOTE) delayed-release tablet 1,000 mg  1,000 mg Oral BID Jair Arnett Jr., D.O.   1,000 mg at 08/26/19 1755   • levothyroxine (SYNTHROID) tablet 200 mcg  200 mcg Oral AM ES Jair Arnett Jr., D.O.   200 mcg at 08/26/19 1544   • omeprazole (PRILOSEC) capsule 20 mg  20 mg Oral DAILY Jair Arnett Jr., D.O.   20 mg at 08/26/19 1546   • venlafaxine (EFFEXOR) 50 mg  50 mg Oral QHS Jair Arnett Jr., D.O.   50 mg at 08/26/19 2203   • venlafaxine XR (EFFEXOR XR) capsule 150 mg  150 mg Oral DAILY Jair Arnett Jr., D.O.   150 mg at 08/26/19 1547   • vitamin D (cholecalciferol) tablet 1,000 Units  1,000 Units Oral DAILY Jair Arnett Jr., D.O.   1,000 Units at 08/26/19 1547   • tamsulosin (FLOMAX) capsule 0.4 mg  0.4 mg Oral AFTER BREAKFAST Jair Arnett Jr., D.O.   0.4 mg at 08/26/19 1546        Fluids    Intake/Output Summary (Last 24 hours) at 8/27/2019 0624  Last data filed at 8/27/2019 0600  Gross per 24 hour   Intake 1667.5 ml   Output 3925 ml   Net -2257.5 ml       Laboratory          Recent Labs     08/26/19  0120 08/27/19  0500   SODIUM 133* 134*   POTASSIUM 4.7 4.5   CHLORIDE 103 104   CO2 22 22   BUN 17 15   CREATININE 0.99 0.82   CALCIUM 8.6 9.7     Recent Labs     08/26/19  0120 08/27/19  0500   GLUCOSE 91 85     Recent Labs     08/26/19  0051 08/27/19  0500   WBC 12.2* 13.1*   NEUTSPOLYS 59.20 68.30   LYMPHOCYTES 27.30 20.70*   MONOCYTES 8.70 6.80   EOSINOPHILS 3.50 3.20   BASOPHILS 0.60 0.50     Recent Labs     08/26/19  0051 08/27/19  0500   RBC 4.18* 4.53*   HEMOGLOBIN 12.8* 14.5   HEMATOCRIT 38.5* 41.9*   PLATELETCT 237 221   PROTHROMBTM 14.2  --    APTT 29.4  --    INR 1.07  --        Imaging  Echo:   Reviewed  Ultrasound:  Reviewed  MRI:   Reviewed    Assessment/Plan  Stroke (HCC)- (present on admission)  Assessment & Plan  Acute ischemic stroke symptoms characterized by the sudden onset of left upper extremity weakness  CT scan of the head negative for acute pathology at outside hospital  S/P alteplase at outside hospital  MRI reviewed  Continue high intensity statin  DAPT  No significant carotid stenosis - loop recorder ordered  Neuro checks every 4 hours    Type 2 diabetes mellitus, without long-term current use of insulin ([facility])  Assessment & Plan  Goal blood glucose 120-180  sliding scale insulin, accuchecks  hypoglycemia protocol  A1c 5.2      Primary hypertension  Assessment & Plan  History of primary hypertension  Strict blood pressure control  Continue Benazepril,  HCTZ    Leukocytosis- (present on admission)  Assessment & Plan  stable    Hypothyroidism- (present on admission)  Assessment & Plan  Continue levothyroxine 200 mcg PO qday    Hyponatremia- (present on admission)  Assessment & Plan  Mild, improved  Monitor sodium    Obesity- (present on admission)  Assessment &  Plan  Nutrition counseling       VTE:  Lovenox  Ulcer: Not Indicated  Lines: None    I have performed a physical exam and reviewed and updated ROS and Plan today (8/27/2019). In review of yesterday's note (8/26/2019), there are no changes except as documented above.     Discussed patient condition and risk of morbidity and/or mortality with Hospitalist, Family, RN, RT, Therapies, Pharmacy, Dietary, , Code status disscussed, Charge nurse / hot rounds, Patient and neurology

## 2019-08-27 NOTE — CARE PLAN
"  Problem: Emergency Care of the Stroke Patient  Goal: Activity as appropriate  Outcome: PROGRESSING AS EXPECTED  Note:        Goal: Nutrition  Outcome: PROGRESSING AS EXPECTED  Note:      Dysphagia Screening Tool  Identifying Dysphagia: Not applicable  Maintains Adequate Arousal & Attention for Feeding? : Yes  Has Slurred Speech, Absent Speech, or Facial Weakness? : No  Able to Manage Oral Secretions: Yes  Check for Strength of Cough: Strong  Give 5mL of Tap Water 3x Via Spoon. Ask Patient to Say \"Aahh\" After Each Swallow: No Problems Detected  Give 90 mls of Water; Instruct Patient to Take Consecutive Swallows: No Problems Detected  Passed Dysphagia Screening, Do the following:: Passed Screening: Notify MD of Screening Results and Obtain Diet Order       Problem: Safety  Goal: Free from accidental injury  Note:   Discussed with patient medications he received, specifically TPA discussed and the increased risk of bleeding and to be extra precautions when ambulating and getting up. Main goal is to continue to remain free from falls. PT/OT order placed for eval.      Problem: Safety  Goal: Free from accidental injury  Note:   Discussed with patient medications he received, specifically TPA discussed and the increased risk of bleeding and to be extra precautions when ambulating and getting up. Main goal is to continue to remain free from falls. PT/OT order placed for eval.      Problem: Safety  Goal: Free from accidental injury  Note:   Discussed with patient medications he received, specifically TPA discussed and the increased risk of bleeding and to be extra precautions when ambulating and getting up. Main goal is to continue to remain free from falls. PT/OT order placed for eval.      "
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
## 2019-08-27 NOTE — PROGRESS NOTES
[facility] Medicine Daily Progress Note    Date of Service  8/27/2019    Chief Complaint  64 y.o. male admitted 8/25/2019 with L upper extremity weakness    Hospital Course    Hx of chronic neck pain, bipolar disorder, HTN, prediatbetes.  In ED in [place] T neg for ICH.  Pt given tPA then transferred to our care      Interval Problem Update  Feels much better this am  Improved strength today  Sinus   SBPs 150-160s  Tolerating po    DW pt's wife and Daughter by phone.  The daughter, Joana,  is an OB/GYNE in [place] her number is [phone]    Consultants/Specialty  Neurology    Code Status  full    Disposition  OK to Neuro floor    Review of Systems  Review of Systems   Constitutional: Negative for chills and fever.   HENT: Negative for nosebleeds and sore throat.    Eyes: Negative for blurred vision and double vision.   Respiratory: Negative for cough and shortness of breath.    Cardiovascular: Negative for chest pain, palpitations and leg swelling.   Gastrointestinal: Negative for abdominal pain, diarrhea, nausea and vomiting.   Genitourinary: Negative for dysuria and urgency.   Musculoskeletal: Negative for back pain.   Skin: Negative for rash.   Neurological: Positive for focal weakness. Negative for dizziness, loss of consciousness and headaches.        Physical Exam  Temp:  [36.4 °C (97.5 °F)-37.3 °C (99.2 °F)] 36.9 °C (98.5 °F)  Pulse:  [54-75] 64  Resp:  [12-38] 19  BP: (126-167)/() 164/76  SpO2:  [91 %-96 %] 93 %    Physical Exam   Constitutional: He is oriented to person, place, and time. He appears well-developed and well-nourished. No distress.   HENT:   Head: Normocephalic and atraumatic.   Nose: Nose normal.   Mouth/Throat: Oropharynx is clear and moist.   Eyes: Conjunctivae are normal.   Neck: No JVD present.   Cardiovascular: Normal rate. Exam reveals no gallop.   No murmur heard.  Pulmonary/Chest: Effort normal. No stridor. No respiratory distress. He has no wheezes. He has no rales.    Abdominal: Soft. There is no tenderness. There is no rebound and no guarding.   Musculoskeletal: He exhibits no edema.   Neurological: He is alert and oriented to person, place, and time.   Skin: Skin is warm and dry. No rash noted. He is not diaphoretic.   Psychiatric: He has a normal mood and affect. Thought content normal.   Nursing note and vitals reviewed.      Fluids    Intake/Output Summary (Last 24 hours) at 8/27/2019 0543  Last data filed at 8/27/2019 0000  Gross per 24 hour   Intake 1761.25 ml   Output 3525 ml   Net -1763.75 ml       Laboratory  Recent Labs     08/26/19  0051 08/27/19  0500   WBC 12.2* 13.1*   RBC 4.18* 4.53*   HEMOGLOBIN 12.8* 14.5   HEMATOCRIT 38.5* 41.9*   MCV 92.1 92.5   MCH 30.6 32.0   MCHC 33.2* 34.6   RDW 44.6 43.6   PLATELETCT 237 221   MPV 9.8 9.5     Recent Labs     08/26/19  0120 08/27/19  0500   SODIUM 133* 134*   POTASSIUM 4.7 4.5   CHLORIDE 103 104   CO2 22 22   GLUCOSE 91 85   BUN 17 15   CREATININE 0.99 0.82   CALCIUM 8.6 9.7     Recent Labs     08/26/19  0051   APTT 29.4   INR 1.07         Recent Labs     08/26/19  0120   TRIGLYCERIDE 184*   HDL 26*   *       Imaging  MR-CERVICAL SPINE-W/O   Final Result      Postsurgical and mild degenerative disease in the cervical spine as described above.      MR-BRAIN-W/O   Final Result      1.  Multifocal small cortical infarcts in the right frontal, parietal and temporal lobes.   2.  Mild cerebral atrophy.   3.  Mild chronic microvascular ischemic disease.      EC-ECHOCARDIOGRAM COMPLETE W/O CONT   Final Result      US-CAROTID DOPPLER BILAT   Final Result      OUTSIDE IMAGES-DX CHEST   Final Result      OUTSIDE IMAGES-CT HEAD   Final Result      CL-IMPLANTABLE LOOP RECORDER    (Results Pending)   CL-IMPLANTABLE LOOP RECORDER    (Results Pending)        Assessment/Plan  Stroke (HCC)- (present on admission)  Assessment & Plan  Sudden onset of left upper extremity weakness.  Status post TPA outlying facility.   Patient will be  admitted to the ICU with neurochecks per TPA protocol  Patient will be placed on continuous cardiac monitoring to evaluate for any arrhythmias  I will order MRI brain, MRA head and neck  I will order MRI cervical spine to evaluate for possible cervical stenosis  Check 2-D echo  Patient will be started on full dose aspirin 24 hours after TPA administration  Patient is a started on high intensity atorvastatin  Strict blood pressure control with IV hydralazine for SBP greater than 180/105  IV fluid hydration with normal saline  Check TSH, lipid panel and hemoglobin A1c  PTOT and ST evaluation  Dr. Quick from neurology was consulted, appreciate recommendations.  He requested that we consult the morning neurologist to follow the patient          Leukocytosis- (present on admission)  Assessment & Plan  Likely reactive  Monitor CBC and vitals    Hyponatremia- (present on admission)  Assessment & Plan  IV fluid hydration with normal saline  Monitor BMP and assess response      Obesity- (present on admission)  Assessment & Plan  Body mass index is 35.62 kg/m².  Patient has been counseled on diet and lifestyle modifications           VTE prophylaxis: none as pt is ambulatory

## 2019-08-27 NOTE — PROGRESS NOTES
Pt had MRI last night. Per Dr Sandra and Hope elizondo to give asprin. Dose given as ordered.   Pt sitting up in chair, pt ate breakfast. Pt stating, he feels good and he's ready to go home. Pt understanding of plan of care including: transfer out of ICU, blood pressure control, medications discussed. Dr Sandra and team discussed post stroke/TPA plan with pt. Pt able to verbalize plan to me in a clear manner. Pt denies pain at this time. Pt to call when he is ready to get out of chair and back to bed. Call light with in reach.

## 2019-08-27 NOTE — THERAPY
"Speech Language Therapy dysphagia treatment completed.     Functional Status:  Pt sitting up in a chair for breakfast with a regular meal tray.  He was AAOx4, reports doing well and no trouble with swallowing.  Pt's voice was strong and clear.  He was able to recall swallow precautions taught to him yesterday.  Pt consumed soft solids, dry solids and thin liquids without any overt s/sx of aspiration.  Pt's swallow trigger was timely and voice remained clear throughout.  Pt was educated regarding s/sx of aspiration to be aware of post discharge and verbalized good understanding.  Recommend to continue regular diet with thin liquids.  SLP will no longer actively follow pt but is available upon request if needed.    Recommendations: continue regular diet with thin liquids     Plan of Care: SLP will no longer actively follow pt but is available upon request if needed    Post-Acute Therapy: Anticipate that the patient will have no further speech therapy needs after discharge from the hospital.    See \"Rehab Therapy-Acute\" Patient Summary Report for complete documentation.     "

## 2019-08-27 NOTE — DISCHARGE PLANNING
Follow up for rehab current documentation does not support therapy need for IRF level of care. No physiatry consult ordered per protocol.

## 2019-08-27 NOTE — THERAPY
"Occupational Therapy Evaluation completed.   Functional Status: Supervised with ADLs and txfs  Plan of Care: Patient with no further skilled OT needs in the acute care setting at this time  Discharge Recommendations:  Currently anticipate no further skilled therapy needs once patient is discharged from the inpatient setting.    See \"Rehab Therapy-Acute\" Patient Summary Report for complete documentation.    "

## 2019-08-28 ENCOUNTER — APPOINTMENT (OUTPATIENT)
Dept: CARDIOLOGY | Facility: MEDICAL CENTER | Age: 64
DRG: 041 | End: 2019-08-28
Attending: HOSPITALIST
Payer: COMMERCIAL

## 2019-08-28 VITALS
HEIGHT: 73 IN | RESPIRATION RATE: 16 BRPM | DIASTOLIC BLOOD PRESSURE: 74 MMHG | TEMPERATURE: 97.8 F | OXYGEN SATURATION: 95 % | BODY MASS INDEX: 35.06 KG/M2 | SYSTOLIC BLOOD PRESSURE: 138 MMHG | WEIGHT: 264.55 LBS | HEART RATE: 70 BPM

## 2019-08-28 PROBLEM — D72.829 LEUKOCYTOSIS: Status: RESOLVED | Noted: 2019-08-26 | Resolved: 2019-08-28

## 2019-08-28 PROBLEM — Z92.82 S/P ADMN TPA IN DIFF FAC W/N LAST 24 HR BEF ADM TO CRNT FAC: Status: ACTIVE | Noted: 2019-08-28

## 2019-08-28 PROCEDURE — 0JH632Z INSERTION OF MONITORING DEVICE INTO CHEST SUBCUTANEOUS TISSUE AND FASCIA, PERCUTANEOUS APPROACH: ICD-10-PCS | Performed by: INTERNAL MEDICINE

## 2019-08-28 PROCEDURE — A9270 NON-COVERED ITEM OR SERVICE: HCPCS | Performed by: INTERNAL MEDICINE

## 2019-08-28 PROCEDURE — 33285 INSJ SUBQ CAR RHYTHM MNTR: CPT | Performed by: INTERNAL MEDICINE

## 2019-08-28 PROCEDURE — 700101 HCHG RX REV CODE 250

## 2019-08-28 PROCEDURE — 99232 SBSQ HOSP IP/OBS MODERATE 35: CPT | Mod: GC | Performed by: INTERNAL MEDICINE

## 2019-08-28 PROCEDURE — 700102 HCHG RX REV CODE 250 W/ 637 OVERRIDE(OP): Performed by: INTERNAL MEDICINE

## 2019-08-28 PROCEDURE — 99239 HOSP IP/OBS DSCHRG MGMT >30: CPT | Performed by: INTERNAL MEDICINE

## 2019-08-28 PROCEDURE — A9270 NON-COVERED ITEM OR SERVICE: HCPCS | Performed by: HOSPITALIST

## 2019-08-28 PROCEDURE — 700102 HCHG RX REV CODE 250 W/ 637 OVERRIDE(OP): Performed by: HOSPITALIST

## 2019-08-28 PROCEDURE — 33285 INSJ SUBQ CAR RHYTHM MNTR: CPT

## 2019-08-28 RX ORDER — ATORVASTATIN CALCIUM 80 MG/1
80 TABLET, FILM COATED ORAL EVERY EVENING
Qty: 30 TAB | Refills: 1 | Status: SHIPPED | OUTPATIENT
Start: 2019-08-28 | End: 2019-10-28 | Stop reason: SDUPTHER

## 2019-08-28 RX ORDER — LIDOCAINE HYDROCHLORIDE AND EPINEPHRINE 10; 10 MG/ML; UG/ML
INJECTION, SOLUTION INFILTRATION; PERINEURAL
Status: COMPLETED
Start: 2019-08-28 | End: 2019-08-28

## 2019-08-28 RX ORDER — CLOPIDOGREL BISULFATE 75 MG/1
75 TABLET ORAL DAILY
Qty: 30 TAB | Refills: 1 | Status: SHIPPED | OUTPATIENT
Start: 2019-08-29 | End: 2019-09-06

## 2019-08-28 RX ADMIN — LIDOCAINE HYDROCHLORIDE AND EPINEPHRINE: 10; 10 INJECTION, SOLUTION INFILTRATION; PERINEURAL at 11:08

## 2019-08-28 RX ADMIN — TAMSULOSIN HYDROCHLORIDE 0.4 MG: 0.4 CAPSULE ORAL at 09:03

## 2019-08-28 RX ADMIN — VITAMIN D, TAB 1000IU (100/BT) 1000 UNITS: 25 TAB at 05:22

## 2019-08-28 RX ADMIN — SENNOSIDES, DOCUSATE SODIUM 2 TABLET: 50; 8.6 TABLET, FILM COATED ORAL at 05:22

## 2019-08-28 RX ADMIN — DIVALPROEX SODIUM 1000 MG: 500 TABLET, DELAYED RELEASE ORAL at 05:23

## 2019-08-28 RX ADMIN — BENAZEPRIL HYDROCHLORIDE 10 MG: 20 TABLET ORAL at 05:23

## 2019-08-28 RX ADMIN — ASPIRIN 81 MG: 81 TABLET, COATED ORAL at 05:23

## 2019-08-28 RX ADMIN — HYDROCHLOROTHIAZIDE 25 MG: 25 TABLET ORAL at 05:23

## 2019-08-28 RX ADMIN — CLOPIDOGREL BISULFATE 75 MG: 75 TABLET ORAL at 05:23

## 2019-08-28 RX ADMIN — VENLAFAXINE HYDROCHLORIDE 150 MG: 75 CAPSULE, EXTENDED RELEASE ORAL at 05:22

## 2019-08-28 RX ADMIN — LEVOTHYROXINE SODIUM 200 MCG: 25 TABLET ORAL at 05:22

## 2019-08-28 RX ADMIN — OMEPRAZOLE 20 MG: 20 CAPSULE, DELAYED RELEASE ORAL at 05:22

## 2019-08-28 NOTE — PROGRESS NOTES
Cath lab called regarding loop recorder placement, left message.  Patient resting in bed with no complaints, call light in reach, wife at bedside.

## 2019-08-28 NOTE — CARE PLAN
Problem: Safety  Goal: Will remain free from injury  Outcome: PROGRESSING AS EXPECTED  Note:   Pt instructed and educated on use of call light, pt shows understanding.       Problem: Safety  Goal: Will remain free from falls  Outcome: PROGRESSING AS EXPECTED  Note:   Pt's bed low, locked, bed alarm on, q1h rounding, call light use education given to pt

## 2019-08-28 NOTE — PROGRESS NOTES
Patient transported back to unit by RN on zoll monitor.  Discharge education provided to patient and wife regarding medications, appointments, follow up, when to call the doctor, and expected phone call by cardiology regarding loop recorder.  Insertion site WNL, patient and wife verbalized understanding of plan and satisfaction with care provided.

## 2019-08-28 NOTE — OP REPORT
PROCEDURE PERFORMED: Implantable Loop Recorder    DATE OF SERVICE: 8/28/2019    : Babak Stacy MD    ASSISTANT: None    ANESTHESIA: Local    EBL: <5 cc    SPECIMENS: None    INDICATION(S):  Cryptogenic stroke    DESCRIPTION OF PROCEDURE:  After informed written consent, the patient was brought to the cath lab pre/post procedure area. The patient was prepped and draped in the usual sterile fashion. The procedure was performed with local anesthetic. Using the supplied incision tool, a <1 cm incision was made in the skin about 2 cm leftward and lateral to the sternal border. Using the supplied insertion tool, a tunnel was made in the subcutaneous tissue at a 45 degree angle to the sternum and the device was inserted with the supplied plunger. Manual compression was used until hemostasis achieved. Sterile dressing was applied.    IMPLANTED DEVICE INFORMATION:  Model: Candescent SoftBase LNQ11   Serial number: [id number]    IMPRESSIONS:  1. Successful implantable loop recorder implantation    RECOMMENDATIONS:  1. Routine follow-up and device interrogation

## 2019-08-28 NOTE — DISCHARGE SUMMARY
Discharge Summary    CHIEF COMPLAINT ON ADMISSION  Chief Complaint   Patient presents with   • Possible Stroke     TPA administered pta       Reason for Admission  EMS     Admission Date  8/25/2019    CODE STATUS  Full Code    HPI & HOSPITAL COURSE  64-year-old male past medical history of obesity, chronic neck status post cervical fusion pain, disorder, hypertension, prediabetes presented with left upper extremity weakness.  Initially was taken to ER in [facility].  He underwent CT head without contrast which was negative.  He had some improvement of symptoms.  It was felt that he was a candidate for alteplase therapy which was done outside our facility transferred to [facility] for higher level of care. He was closed monitored in ICU per stroke protocol after alteplase treatment.  MRI brain was done here at [facility] and it showed multifocal small cortical infarcts of the right frontal, parietal and temporal lobes.  Ultrasound carotid was <50% stenoses.  Echocardiogram negative for PFO or clots.  Ejection fraction 65%.  Cardiology was consulted since this is high risk for embolic/cryptogenic cerebrovascular accident.  It was recommended loop recorder placement which was placed successfully 08/28/2019. He was sent home with close follow up with PCP. He was started asa/plavix. He also needs to follow up with cardiology within a week.     Therefore, he is discharged in guarded and stable condition to home with close outpatient follow-up.    The patient met 2-midnight criteria for an inpatient stay at the time of discharge.    Discharge Date  08/28/2019    FOLLOW UP ITEMS POST DISCHARGE  Loop recorder monitoring     DISCHARGE DIAGNOSES  Active Problems:    Stroke (HCC) POA: Yes    Primary hypertension POA: Unknown    Type 2 diabetes mellitus, without long-term current use of insulin (HCC) POA: Unknown    S/P admn tPA in diff fac w/n last 24 hr bef adm to crnt fac POA: Unknown    Obesity POA: Yes     Hyponatremia POA: Yes    Hypothyroidism POA: Yes  Resolved Problems:    Leukocytosis POA: Yes      FOLLOW UP  No future appointments.  Wilson Gómez M.D.  [address] [facility]  [place] [address]  [phone]    Schedule an appointment as soon as possible for a visit today      Kyle Sandra M.D.  [address]  [address]  [facility] [address]  [phone]            MEDICATIONS ON DISCHARGE     Medication List      START taking these medications      Instructions   atorvastatin 80 MG tablet  Commonly known as:  LIPITOR   Take 1 Tab by mouth every evening.  Dose:  80 mg     clopidogrel 75 MG Tabs  Start taking on:  8/29/2019  Commonly known as:  PLAVIX   Take 1 Tab by mouth every day.  Dose:  75 mg        CONTINUE taking these medications      Instructions   alfuzosin 10 MG SR tablet  Commonly known as:  UROXATRAL   Take 10 mg by mouth every day.  Dose:  10 mg     aspirin 81 MG Chew chewable tablet  Commonly known as:  ASA   Take 81 mg by mouth every day.  Dose:  81 mg     benazepril 5 MG Tabs  Commonly known as:  LOTENSIN   Take 5 mg by mouth every day.  Dose:  5 mg     divalproex 500 MG Tbec  Commonly known as:  DEPAKOTE   Take 1,000 mg by mouth 2 Times a Day.  Dose:  1,000 mg     levothyroxine 200 MCG Tabs  Commonly known as:  SYNTHROID   Take 200 mcg by mouth Every morning on an empty stomach.  Dose:  200 mcg     omeprazole 20 MG delayed-release capsule  Commonly known as:  PRILOSEC   Take 20 mg by mouth every day.  Dose:  20 mg     tramadol 50 MG Tabs  Commonly known as:  ULTRAM   Take 50 mg by mouth every 8 hours as needed for Severe Pain.  Dose:  50 mg     * venlafaxine 150 MG extended-release capsule  Commonly known as:  EFFEXOR-XR   Take 150 mg by mouth every day. 150 mg in the morning at 50 mg in the evening.  Dose:  150 mg     * venlafaxine 50 MG tablet  Commonly known as:  EFFEXOR   Take 50 mg by mouth every bedtime.  Dose:  50 mg     vitamin D3 5000 units Caps   Take 1 Cap by mouth every day.  Dose:   1 Cap         * This list has 2 medication(s) that are the same as other medications prescribed for you. Read the directions carefully, and ask your doctor or other care provider to review them with you.            STOP taking these medications    naproxen 500 MG Tabs  Commonly known as:  NAPROSYN            Allergies  Allergies   Allergen Reactions   • Lithium      Visual disturbance       DIET  Orders Placed This Encounter   Procedures   • Diet Order Regular     Standing Status:   Standing     Number of Occurrences:   1     Order Specific Question:   Diet:     Answer:   Regular [1]   • Discontinue Diet Tray     Standing Status:   Standing     Number of Occurrences:   1   • Discontinue Diet Tray     Standing Status:   Standing     Number of Occurrences:   1       ACTIVITY  As tolerated.  Weight bearing as tolerated    CONSULTATIONS  Neurology   Cardiology     PROCEDURES  t-pa treatment   Loop recorder placement     LABORATORY  Lab Results   Component Value Date    SODIUM 134 (L) 08/27/2019    POTASSIUM 4.5 08/27/2019    CHLORIDE 104 08/27/2019    CO2 22 08/27/2019    GLUCOSE 85 08/27/2019    BUN 15 08/27/2019    CREATININE 0.82 08/27/2019        Lab Results   Component Value Date    WBC 13.1 (H) 08/27/2019    HEMOGLOBIN 14.5 08/27/2019    HEMATOCRIT 41.9 (L) 08/27/2019    PLATELETCT 221 08/27/2019        Total time of the discharge process exceeds 30 minutes.

## 2019-08-28 NOTE — PROGRESS NOTES
Monitor strip summary sinus rhythm 60-80 with occasional pacs. Pr .20 qrs.08 qt.40 per strip from monitor room

## 2019-08-28 NOTE — PROGRESS NOTES
Cath lab returned message and has patient scheduled for loop recorder placement at 1130 today.  Patient and wife updated on plan of care.  Beverages provided, no other needs identified.

## 2019-08-28 NOTE — PROGRESS NOTES
Pt transported via w/c to S 193-1 wife came with us. Pt did not have any personal belongings other than personal sox. Pt wearing yellow fall precaution sox. Once in S193-1 pt assisted to bed and into position of comfort. ICU cardiac monitor removed and neuro applied their monitor. Plan of care/report completed. NIH stroke scale preformed and signed off by both RNs. See other note. Care transferred to [place].

## 2019-08-28 NOTE — PROGRESS NOTES
Cardiology Consult Daily Progress Note     CC: Acute CVA of right frontal & temporal lobes     HPI:  A 64 y.o. male with PMHx of chronic neck pain status post cervical fusion, bipolar disorder, hypertension, obesity who presented 8/25/2019 with sudden onset of left upper extremity weakness . Pt stated that he cannot control his left hand anymore. He reports a family history of CVA in his mother.  He was taken to the ER in [place] where he underwent a CT head without contrast that was negative for process.  Pt was within tPA window , so tPA was started & transferred to [facility].   MRI brain showed  Multifocal small cortical infarcts in the right frontal, parietal and temporal lobes.  US carotids showed <50% stenosis in B/L ICA.   ECHO with bubble study showed EF 65% with no PFO, no valve abnormalities, RVSP 20 mmHg.    Cardiology was consulted for loop recorder for evlauation of AFib.   Per pt & his wife, he has been drinking 6 cans of energy drinks per day for years.       MEDICATIONS:    Current Facility-Administered Medications:   •  tramadol (ULTRAM) 50 MG tablet 50 mg, 50 mg, Oral, Q8HRS PRN, Jair Arnett Jr., D.O.  •  benazepril (LOTENSIN) tablet 10 mg, 10 mg, Oral, DAILY, Frankie Howell D.O., 10 mg at 08/28/19 0523  •  hydroCHLOROthiazide (HYDRODIURIL) tablet 25 mg, 25 mg, Oral, Q DAY, Frankie Howell D.O., 25 mg at 08/28/19 0523  •  clopidogrel (PLAVIX) tablet 75 mg, 75 mg, Oral, DAILY, Frankie Howell D.O., 75 mg at 08/28/19 0523  •  aspirin EC (ECOTRIN) tablet 81 mg, 81 mg, Oral, DAILY, Jair Arnett Jr., D.O., 81 mg at 08/28/19 0523  •  senna-docusate (PERICOLACE or SENOKOT S) 8.6-50 MG per tablet 2 Tab, 2 Tab, Oral, BID, 2 Tab at 08/28/19 0522 **AND** polyethylene glycol/lytes (MIRALAX) PACKET 1 Packet, 1 Packet, Oral, QDAY PRN **AND** magnesium hydroxide (MILK OF MAGNESIA) suspension 30 mL, 30 mL, Oral, QDAY PRN **AND** bisacodyl (DULCOLAX) suppository 10 mg, 10 mg,  Rectal, QDAY PRN, Frandy Singh M.D.  •  Respiratory Care per Protocol, , Nebulization, Continuous RT, Frandy Singh M.D.  •  acetaminophen (TYLENOL) tablet 650 mg, 650 mg, Oral, Q6HRS PRN, Frandy Singh M.D.  •  ondansetron (ZOFRAN) syringe/vial injection 4 mg, 4 mg, Intravenous, Q4HRS PRN, Frandy Singh M.D.  •  ondansetron (ZOFRAN ODT) dispertab 4 mg, 4 mg, Oral, Q4HRS PRN, Frandy Singh M.D.  •  promethazine (PHENERGAN) tablet 12.5-25 mg, 12.5-25 mg, Oral, Q4HRS PRN, Frandy Singh M.D.  •  promethazine (PHENERGAN) suppository 12.5-25 mg, 12.5-25 mg, Rectal, Q4HRS PRN, Frandy Singh M.D.  •  prochlorperazine (COMPAZINE) injection 5-10 mg, 5-10 mg, Intravenous, Q4HRS PRN, Frandy Singh M.D.  •  labetalol (NORMODYNE,TRANDATE) injection 10 mg, 10 mg, Intravenous, Q4HRS PRN **OR** hydrALAZINE (APRESOLINE) injection 10 mg, 10 mg, Intravenous, Q2HRS PRN, Frandy Singh M.D.  •  atorvastatin (LIPITOR) tablet 80 mg, 80 mg, Oral, Q EVENING, Frandy Singh M.D., 80 mg at 08/27/19 1737  •  divalproex (DEPAKOTE) delayed-release tablet 1,000 mg, 1,000 mg, Oral, BID, Jair Arnett Jr. D.O., 1,000 mg at 08/28/19 0523  •  levothyroxine (SYNTHROID) tablet 200 mcg, 200 mcg, Oral, AM ES, Jair Arnett Jr. D.O., 200 mcg at 08/28/19 0522  •  omeprazole (PRILOSEC) capsule 20 mg, 20 mg, Oral, DAILY, RENNY Rios Jr.O., 20 mg at 08/28/19 0522  •  venlafaxine (EFFEXOR) 50 mg, 50 mg, Oral, QHS, Jair Arnett Jr. D.O., 50 mg at 08/27/19 2009  •  venlafaxine XR (EFFEXOR XR) capsule 150 mg, 150 mg, Oral, DAILY, Jair Arnett Jr. D.O., 150 mg at 08/28/19 0522  •  vitamin D (cholecalciferol) tablet 1,000 Units, 1,000 Units, Oral, DAILY, Jair Arnett Jr. D.O., 1,000 Units at 08/28/19 0522  •  tamsulosin (FLOMAX) capsule 0.4 mg, 0.4 mg, Oral, AFTER BREAKFAST, Jair Arnett Jr., D.O., 0.4 mg at 08/28/19 0903    ALLERGIES:   Mr. Ingram  is allergic to lithium.     SURGERIES:  Mr. Ingram   has a past surgical history that includes pr  "polysomnography w cpap (4/6/2017).     MEDICAL ILLNESSES:  Mr. Ingram   has a past medical history of Bipolar 1 disorder (HCC).     SOCIAL HISTORY:  Mr. Ingram   reports that he quit smoking about 20 years ago. His smoking use included cigarettes. He has never used smokeless tobacco.     FAMILY HISTORY:  Mr.'s Ingram  family history is not on file.      ROS:    Constitutional: Patient has had no fevers or chills or fatigue. Patient has has no significant weight change.  Eyes: Patient had no visual changes or vision loss.  ENT: Patient denies any sore throat or allergic rhinitis.  Respiratory: Patient has had no cough or sputum production. Also, no hemoptysis.  Cardiovascular: As noted above.  GI: Patient denies any nausea, vomiting, or diarrhea. Patient has had no hematemesis or melena.  : Patient denies any urinary urgency, frequency, or dysuria. Patient has noted no hematuria.  Orthopedic: Patient has no particular problem with arthritis. Patient is able to walk and exercise without difficulty.  Neurologic: Patient has had no focal numbness or weakness.   Psychiatric: Patient denies any difficulty with anxiety or depression.  Endocrine: Patient denies any history of thyroid disorder or diabetes. Patient denies any heat or cold intolerance. In addition, patient denies any polydipsia or polyuria.  Hematologic: Patient has had no easy bruising or bleeding. Patient denies any history of anemia.  Skin: Patient denies any unusual rashes or skin lesions.  Allergic/immunologic: Patient denies any difficulty with hayfever or other environmental allergens. Patient denies any food allergies.       PHYSICAL EXAM:    /68   Pulse (!) 58   Temp 36.5 °C (97.7 °F) (Temporal)   Resp 16   Ht 1.854 m (6' 1\")   Wt 120 kg (264 lb 8.8 oz)   SpO2 93%   BMI 34.90 kg/m²      General: 64-year-old male in bed no acute distress.  Cardio: Normal S1/S2. No peripheral edema.   Pulm: CTAX2. No respiratory distress.   Skin: Warm, dry, " no rashes or lesions   Psychiatric: Appropriate affect. No active psychosis.  HEENT: Atraumatic head, normal sclera and conjunctiva, moist oral mucosa. No lid lag.  Abdomen: Soft, non tender. No masses or hepatosplenomegaly.     Neurologic:  Mental Status:  AAOx4. Able to follow commands/cross midline. Speech fluent/nondysarthric. Language functions intact. No neglect/apraxia.  Cranial Nerves:  PERRL. EOMi. Face symmetric, palate/tongue midline. Visual fields full to confrontation. Facial sensation intact.   Motor:  Normal muscle tone and bulk. Strength is 5/5 throughout with the exception of 4/5 in the left upper extremity.  Reflexes:  2/4 throughout. Flexor plantar responses bilaterally.   Coordination: Finger-to-nose without ataxia  Sensation: Normal to light touch throughout  Gait/Station: Deferred.       Lab Results   Component Value Date/Time    CHOLSTRLTOT 164 08/26/2019 01:20 AM     (H) 08/26/2019 01:20 AM    HDL 26 (A) 08/26/2019 01:20 AM    TRIGLYCERIDE 184 (H) 08/26/2019 01:20 AM       Lab Results   Component Value Date/Time    SODIUM 134 (L) 08/27/2019 05:00 AM    POTASSIUM 4.5 08/27/2019 05:00 AM    CHLORIDE 104 08/27/2019 05:00 AM    CO2 22 08/27/2019 05:00 AM    GLUCOSE 85 08/27/2019 05:00 AM    BUN 15 08/27/2019 05:00 AM    CREATININE 0.82 08/27/2019 05:00 AM     No results found for: ALKPHOSPHAT, ASTSGOT, ALTSGPT, TBILIRUBIN   No results found for: BNPBTYPENAT    Patient Active Problem List    Diagnosis Date Noted   • Stroke (HCC) 08/26/2019     Priority: High   • Leukocytosis 08/26/2019     Priority: Medium   • Primary hypertension 08/26/2019     Priority: Medium   • Type 2 diabetes mellitus, without long-term current use of insulin (HCC) 08/26/2019     Priority: Medium   • Obesity 08/26/2019     Priority: Low   • Hyponatremia 08/26/2019     Priority: Low   • Hypothyroidism 08/26/2019     Priority: Low         ASSESSMENT & PLAN:  # 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   -ECHO with bubble study showed EF 65% with no PFO, no valve abnormalities, RVSP 20 mmHg.    - Cardiology was consulted for loop recorder for evlauation of AFib.   - Loop recorder ordered , since US carotids did not show significant level of stenosis   - Pt is on ASA , Plavix ,  high dose statin   - Loop recorder will be placed today.   - Education about diet & exercise done at bedside, advised to avoid energy drinks   - If loop recorder showed A fib, pt will need anticoagulation since his MAXWELL VASc is 3  - Pt will need outpatient cardiology follow up in 1 week on discharge.   - Cardiology will sign off. Please call us with nay questions or concerns.

## 2019-08-28 NOTE — DISCHARGE INSTRUCTIONS
Discharge Instructions per Simone Da Silva M.D.  Start taking cholesterol medication   Also another new medication is plavix which is anticoagulant similar as aspirin. You have to take both aspirin 81 mg and plavix at this time. Follow up with neurology, primary care and cardiology as outpatient   Stop plavix if you notice major bleeding and discuss with primary care  Continue rest of medications as prescribed before     DIET: healthy     ACTIVITY: as tolerated     DIAGNOSIS: cerebral vascular accident     Return to ER if worsening mentation, slurred speech, weakness, shortness of breath, chest pain, fever, major bleeding       Discharge Instructions    Discharged to home by car with relative. Discharged via wheelchair, hospital escort: Yes.  Special equipment needed: Not Applicable    Be sure to schedule a follow-up appointment with your primary care doctor or any specialists as instructed.     Discharge Plan:        I understand that a diet low in cholesterol, fat, and sodium is recommended for good health. Unless I have been given specific instructions below for another diet, I accept this instruction as my diet prescription.   Other diet: regular home diet    Special Instructions: None    · Is patient discharged on Warfarin / Coumadin?   No     Depression / Suicide Risk    As you are discharged from this [facility] facility, it is important to learn how to keep safe from harming yourself.    Recognize the warning signs:  · Abrupt changes in personality, positive or negative- including increase in energy   · Giving away possessions  · Change in eating patterns- significant weight changes-  positive or negative  · Change in sleeping patterns- unable to sleep or sleeping all the time   · Unwillingness or inability to communicate  · Depression  · Unusual sadness, discouragement and loneliness  · Talk of wanting to die  · Neglect of personal appearance   · Rebelliousness- reckless behavior  · Withdrawal from  people/activities they love  · Confusion- inability to concentrate     If you or a loved one observes any of these behaviors or has concerns about self-harm, here's what you can do:  · Talk about it- your feelings and reasons for harming yourself  · Remove any means that you might use to hurt yourself (examples: pills, rope, extension cords, firearm)  · Get professional help from the community (Mental Health, Substance Abuse, psychological counseling)  · Do not be alone:Call your Safe Contact- someone whom you trust who will be there for you.  · Call your local CRISIS HOTLINE 981-2845 or 692-367-1941  · Call your local Children's Mobile Crisis Response Team [place] (395) 462-0013 or www.365net  · Call the toll free National Suicide Prevention Hotlines   · National Suicide Prevention Lifeline 951-030-TYBA (3771)  · National Hope Line Network 800-SUICIDE (955-2577)

## 2019-08-28 NOTE — PROGRESS NOTES
"Chart reviewed for AMI and HF quality measures. Neither diagnosis applicable at this time, patient is being followed by cardiology for CVA and AF.     Please place a \"Consult Nurse Navigator\" order (can be found in the HF order set) should AMI or HF become an active diagnosis.    Rena ARAIZA RN, [facility] ext. [phone] M-F        "

## 2019-09-03 ENCOUNTER — TELEPHONE (OUTPATIENT)
Dept: CARDIOLOGY | Facility: MEDICAL CENTER | Age: 64
End: 2019-09-03

## 2019-09-03 NOTE — TELEPHONE ENCOUNTER
Patient transmitted via Loop Recorder question of AF episode that lasted 2 minutes on 9/1 at 5:22am. To be scanned for review.

## 2019-09-06 ENCOUNTER — TELEPHONE (OUTPATIENT)
Dept: CARDIOLOGY | Facility: MEDICAL CENTER | Age: 64
End: 2019-09-06

## 2019-09-06 NOTE — TELEPHONE ENCOUNTER
PAR sent to plan:  VINH MILLS  Key: [id number]  - PA Case ID: [id number] - Rx #: [id number] Need help? Call us at [phone]   Status  Sent to [facility]  DrugEliquis 5MG tablets  FormAnthem Exchange Electronic PA Form  Original Claim InfoMR CLOSED FORM.FORM REVIEW CALL [phone]

## 2019-09-06 NOTE — TELEPHONE ENCOUNTER
Called wife, Gaye, to advise. New RX sent to pharmacy. Scheduled hospital [facility] next week (per DC summary) with Benita Mireles forwarded to neurologist.    Message   Received: Today   Message Contents   VILMA Maradiaga L.PYUE   Caller: Unspecified (3 days ago,  2:16 PM)             Stop ASA/plavix. Start Eliquis 5 BID. Route this to his neurologist.

## 2019-09-11 ENCOUNTER — OFFICE VISIT (OUTPATIENT)
Dept: CARDIOLOGY | Facility: MEDICAL CENTER | Age: 64
End: 2019-09-11
Payer: COMMERCIAL

## 2019-09-11 VITALS
HEIGHT: 73 IN | OXYGEN SATURATION: 98 % | HEART RATE: 82 BPM | SYSTOLIC BLOOD PRESSURE: 120 MMHG | BODY MASS INDEX: 35.2 KG/M2 | DIASTOLIC BLOOD PRESSURE: 76 MMHG | WEIGHT: 265.6 LBS

## 2019-09-11 DIAGNOSIS — I63.139 CEREBROVASCULAR ACCIDENT (CVA) DUE TO EMBOLISM OF CAROTID ARTERY, UNSPECIFIED BLOOD VESSEL LATERALITY (HCC): ICD-10-CM

## 2019-09-11 DIAGNOSIS — G47.33 OSA (OBSTRUCTIVE SLEEP APNEA): ICD-10-CM

## 2019-09-11 DIAGNOSIS — I48.0 PAROXYSMAL ATRIAL FIBRILLATION (HCC): ICD-10-CM

## 2019-09-11 DIAGNOSIS — Z79.01 CHRONIC ANTICOAGULATION: ICD-10-CM

## 2019-09-11 DIAGNOSIS — Z98.890 HISTORY OF LOOP RECORDER: ICD-10-CM

## 2019-09-11 LAB — EKG IMPRESSION: NORMAL

## 2019-09-11 PROCEDURE — 99214 OFFICE O/P EST MOD 30 MIN: CPT | Performed by: NURSE PRACTITIONER

## 2019-09-11 PROCEDURE — 93000 ELECTROCARDIOGRAM COMPLETE: CPT | Performed by: INTERNAL MEDICINE

## 2019-09-11 RX ORDER — METOPROLOL SUCCINATE 25 MG/1
25 TABLET, EXTENDED RELEASE ORAL DAILY
Qty: 30 TAB | Refills: 3 | Status: SHIPPED | OUTPATIENT
Start: 2019-09-11 | End: 2019-12-12 | Stop reason: SDUPTHER

## 2019-09-11 ASSESSMENT — ENCOUNTER SYMPTOMS
CLAUDICATION: 0
FEVER: 0
WHEEZING: 0
PND: 0
PALPITATIONS: 1
ABDOMINAL PAIN: 0
SHORTNESS OF BREATH: 0
CHILLS: 0
DIZZINESS: 0
COUGH: 0
BLOOD IN STOOL: 0
LOSS OF CONSCIOUSNESS: 0
DIAPHORESIS: 0
ORTHOPNEA: 0

## 2019-09-11 NOTE — PROGRESS NOTES
Cardiology/Electrophysiology Follow-up Note    Subjective:   Chief Complaint:   Chief Complaint   Patient presents with   • Atrial Fibrillation     Hospital      Jeremi Ingram is a 64 y.o. male who presents today for new diagnosis Paroxysmal Atrial Fibrillation seen on ILR s/p CVA of unknown etiology.     He is not established with our Cardiology practice.  Recent ILR implantation on 08/28/19 by Dr. Stacy s/p CVA of unknown etiology. ILR remote monitor shows a 2 minute episode of AF on 9/01/19. Per Dr. Stacy, patient stopped ASA/plavix and started Eliquis.     Medical history significant for CVA s/p ILR 08/28/19, hypertension, hypothyroidism, Diabetes mellitus type II, and CELIA-non-compliant with CPAP.     He presents today with his wife, he states he is feeling well.  He denies chest pain, dizziness, pre syncope or syncope, dyspnea, PND, orthopnea, or lower extremity edema.  Reports occasional palpitations and some dyspnea with exertion. Some residual left sided weakness, resolving. Denies any signs or symptoms of bleeding or bleeding issues. He is followed by PCP and neurology and scheduled to follow up with Neurology in October. Patient endorses medication compliance.     Past Medical History:   Diagnosis Date   • Bipolar 1 disorder (HCC)      Past Surgical History:   Procedure Laterality Date   • PB POLYSOMNOGRAPHY W/CPAP  4/6/2017          History reviewed. No pertinent family history.  Social History     Socioeconomic History   • Marital status:      Spouse name: Not on file   • Number of children: Not on file   • Years of education: Not on file   • Highest education level: Not on file   Occupational History   • Not on file   Social Needs   • Financial resource strain: Not on file   • Food insecurity:     Worry: Not on file     Inability: Not on file   • Transportation needs:     Medical: Not on file     Non-medical: Not on file   Tobacco Use   • Smoking status: Former Smoker     Types: Cigarettes      Last attempt to quit: 1999     Years since quittin.0   • Smokeless tobacco: Never Used   Substance and Sexual Activity   • Alcohol use: Not on file   • Drug use: Not on file   • Sexual activity: Not on file   Lifestyle   • Physical activity:     Days per week: Not on file     Minutes per session: Not on file   • Stress: Not on file   Relationships   • Social connections:     Talks on phone: Not on file     Gets together: Not on file     Attends [employer] service: Not on file     Active member of club or organization: Not on file     Attends meetings of clubs or organizations: Not on file     Relationship status: Not on file   • Intimate partner violence:     Fear of current or ex partner: Not on file     Emotionally abused: Not on file     Physically abused: Not on file     Forced sexual activity: Not on file   Other Topics Concern   • Not on file   Social History Narrative   • Not on file     Allergies   Allergen Reactions   • Gabapentin      Other reaction(s): Other (See Comments)  Leg pain and weakness   • Lithium      Visual disturbance   • Morphine Unspecified     Hallucinations       Current Outpatient Medications   Medication Sig Dispense Refill   • metoprolol SR (TOPROL XL) 25 MG TABLET SR 24 HR Take 1 Tab by mouth every day. 30 Tab 3   • apixaban (ELIQUIS) 5mg Tab Take 1 Tab by mouth 2 Times a Day. 60 Tab 5   • atorvastatin (LIPITOR) 80 MG tablet Take 1 Tab by mouth every evening. 30 Tab 1   • tramadol (ULTRAM) 50 MG Tab Take 50 mg by mouth every 8 hours as needed for Severe Pain.     • divalproex (DEPAKOTE) 500 MG Tablet Delayed Response Take 1,000 mg by mouth 2 Times a Day.     • venlafaxine (EFFEXOR-XR) 150 MG extended-release capsule Take 150 mg by mouth every day. 150 mg in the morning at 50 mg in the evening.     • venlafaxine (EFFEXOR) 50 MG tablet Take 50 mg by mouth every bedtime.     • omeprazole (PRILOSEC) 20 MG delayed-release capsule Take 20 mg by mouth every day.     • benazepril  "(LOTENSIN) 5 MG Tab Take 5 mg by mouth every day.     • levothyroxine (SYNTHROID) 200 MCG Tab Take 200 mcg by mouth Every morning on an empty stomach.     • alfuzosin (UROXATRAL) 10 MG SR tablet Take 10 mg by mouth every day.     • Cholecalciferol (VITAMIN D3) 5000 units Cap Take 1 Cap by mouth every day.       No current facility-administered medications for this visit.      Review of Systems   Constitutional: Negative for chills, diaphoresis and fever.   Respiratory: Negative for cough, shortness of breath and wheezing.    Cardiovascular: Positive for palpitations. Negative for chest pain, orthopnea, claudication, leg swelling and PND.   Gastrointestinal: Negative for abdominal pain and blood in stool.   Genitourinary: Negative for hematuria.   Neurological: Negative for dizziness and loss of consciousness.     All others systems reviewed and negative.   Objective:     /76 (BP Location: Left arm, Patient Position: Sitting, BP Cuff Size: Adult)   Pulse 82   Ht 1.854 m (6' 1\")   Wt 120.5 kg (265 lb 9.6 oz)   SpO2 98%  Body mass index is 35.04 kg/m².    Physical Exam   Constitutional: He is oriented to person, place, and time. No distress.   HENT:   Head: Normocephalic.   Neck: Normal range of motion. No JVD present.   Cardiovascular: Normal rate and regular rhythm. Exam reveals friction rub.   No murmur heard.  Pulses:       Radial pulses are 2+ on the right side, and 2+ on the left side.        Dorsalis pedis pulses are 2+ on the right side, and 2+ on the left side.   ILR device uncomplicated, no erosion.   Pulmonary/Chest: Effort normal and breath sounds normal. No respiratory distress. He has no wheezes. He has no rales. He exhibits no tenderness.   Abdominal: Soft. Bowel sounds are normal.   Musculoskeletal: He exhibits no edema.   Neurological: He is alert and oriented to person, place, and time.   Skin: Skin is warm and dry. He is not diaphoretic. No erythema.   Psychiatric: Mood, memory, affect and " judgment normal.   Nursing note and vitals reviewed.    Cardiac Imaging and Procedures Review:    EKG 09/11/2019: sinus rhythm    Echocardiogram (08/26/2019):   No prior study is available for comparison.   Normal left ventricular systolic function.  Left ventricular ejection fraction is visually estimated to be 65%.  No evidence of right to left shunt by agitated saline challenge.  No significant valve abnormalities.   Estimated right ventricular systolic pressure is 20 mmHg.  A definite cardiac source for a systemic thromboembolic event is not identified, failure to do so does not exclude its presence. If   clinically indicated, a transesophageal study would be useful.     ILR- Procedural Conclusions per Dr. Stacy's Op Note (08/28/2019):  PROCEDURE PERFORMED: Implantable Loop Recorder  DATE OF SERVICE: 8/28/2019  : Babak Stacy MD  ASSISTANT: None  ANESTHESIA: Local  INDICATION(S):  Cryptogenic stroke  IMPLANTED DEVICE INFORMATION:  Model: Medtronic LNQ11   Serial number: [id number]  IMPRESSIONS:  1. Successful implantable loop recorder implantation    Labs (personally reviewed and notable for):   Lab Results   Component Value Date/Time    SODIUM 134 (L) 08/27/2019 05:00 AM    POTASSIUM 4.5 08/27/2019 05:00 AM    CHLORIDE 104 08/27/2019 05:00 AM    CO2 22 08/27/2019 05:00 AM    GLUCOSE 85 08/27/2019 05:00 AM    BUN 15 08/27/2019 05:00 AM    CREATININE 0.82 08/27/2019 05:00 AM      Lab Results   Component Value Date/Time    WBC 13.1 (H) 08/27/2019 05:00 AM    RBC 4.53 (L) 08/27/2019 05:00 AM    HEMOGLOBIN 14.5 08/27/2019 05:00 AM    HEMATOCRIT 41.9 (L) 08/27/2019 05:00 AM    MCV 92.5 08/27/2019 05:00 AM    MCH 32.0 08/27/2019 05:00 AM    MCHC 34.6 08/27/2019 05:00 AM    MPV 9.5 08/27/2019 05:00 AM    NEUTSPOLYS 68.30 08/27/2019 05:00 AM    LYMPHOCYTES 20.70 (L) 08/27/2019 05:00 AM    MONOCYTES 6.80 08/27/2019 05:00 AM    EOSINOPHILS 3.20 08/27/2019 05:00 AM    BASOPHILS 0.50 08/27/2019 05:00 AM      PT/INR:    Lab Results   Component Value Date/Time    PROTHROMBTM 14.2 08/26/2019 12:51 AM    INR 1.07 08/26/2019 12:51 AM   ]  Assessment:     1. Paroxysmal atrial fibrillation (HCC)  EKG    TSH WITH REFLEX TO FT4    REFERRAL TO SLEEP STUDIES   2. Cerebrovascular accident (CVA) due to embolism of carotid artery, unspecified blood vessel laterality (HCC)  EKG   3. History of loop recorder     4. Chronic anticoagulation      Eliquis   5. CELIA (obstructive sleep apnea)  REFERRAL TO SLEEP STUDIES     Medical Decision Making:  Today's Assessment / Status / Plan:   1. Paroxysmal Atrial Fibrillation  - New diagnosis seen on ILR which shows 2 min AF episode on 09/01/19.  Per Dr. Stacy, stopped ASA/Plavix and started OAC with Eliquis.  - Recent CVA of unknown etiology s/p ILR implantation by Dr. Stacy on 08/28/19. Likely cardioembolic.   - ILR interrogation today shows SVT possible 1:1 AFL, 27 seconds. No new episodes of AF. Patient reports symptomatic.   - Echocardiogram shows normal LV function, No significant valve abnormalities, EF 65%. Negative for PFO or clots.    - Carotid Ultrasound shows <50% stenoses.    - EKG today shows sinus rhythm, no acute findings.   - TSH 0.080 on Synthroid 200 mcg, will recheck thyroid function, patient to f/u with PCP regarding.   - Reports occasional palpitations.  - Pericardial rub heard on ascultation, asymptomatic, BP stable, no JVD, maybe benign. No abnormalities seen on recent echo.   - On OAC with Eliquis, continue.  - Will start metoprolol XL 25 mg daily.   - Instructed to mointor BP and HR and call office if abnormal.     2. CVA  - Followed by neurology.     3. Chronic Anticoagulation: Eliquis  - No signs/symptoms of bleeding.   - Continue OAC with Eliquis 5 mg BID.    4. CELIA  - Reports hx CELIA, non-compliant with CPAP, years since evalutation.  - Needs reevaluation. Will refer to sleep study.     Plan reviewed in detail with the patient and he verbalizes understanding and is in agreement. RTC  2-3 months with Dr. Stacy, sooner if clinical condition changes.     Thank you for allowing me to participate in this patients care.  Please contact me with any questions or concerns.     CHANTELLE Rivero.   [facility]  [phone]    Collaborating MD/ADD: Dr. Bishop MD.

## 2019-10-03 ENCOUNTER — NON-PROVIDER VISIT (OUTPATIENT)
Dept: CARDIOLOGY | Facility: MEDICAL CENTER | Age: 64
End: 2019-10-03
Payer: COMMERCIAL

## 2019-10-03 DIAGNOSIS — Z95.818 STATUS POST PLACEMENT OF IMPLANTABLE LOOP RECORDER: ICD-10-CM

## 2019-10-03 DIAGNOSIS — I63.9 CRYPTOGENIC STROKE (HCC): ICD-10-CM

## 2019-10-03 PROCEDURE — 93298 REM INTERROG DEV EVAL SCRMS: CPT | Performed by: INTERNAL MEDICINE

## 2019-10-08 ENCOUNTER — TELEPHONE (OUTPATIENT)
Dept: CARDIOLOGY | Facility: MEDICAL CENTER | Age: 64
End: 2019-10-08

## 2019-10-08 NOTE — TELEPHONE ENCOUNTER
GARLAND Rivero R.N.             Please let patient know thyroid function still abnormal.  Patient to f/u with PCP for medication adjustment and TSH monitoring.     Thanks,     CHANTELLE Rivero.   [facility]   [phone]      Results letter mailed.

## 2019-10-10 ENCOUNTER — TELEPHONE (OUTPATIENT)
Dept: CARDIOLOGY | Facility: MEDICAL CENTER | Age: 64
End: 2019-10-10

## 2019-10-10 NOTE — TELEPHONE ENCOUNTER
[facility]    Patient had a MRI on Tuesday and was advised to make sure that the Loop recorder is still working because the MRI turns off the recorder. Please call the patient's wife, Gaye, back at [phone].    Thank you,  Cheyenne LEVIN

## 2019-10-28 DIAGNOSIS — E78.5 HYPERLIPIDEMIA, UNSPECIFIED HYPERLIPIDEMIA TYPE: Primary | ICD-10-CM

## 2019-10-28 RX ORDER — ATORVASTATIN CALCIUM 80 MG/1
80 TABLET, FILM COATED ORAL EVERY EVENING
Qty: 90 TAB | Refills: 3 | Status: SHIPPED | OUTPATIENT
Start: 2019-10-28

## 2019-11-11 ENCOUNTER — TELEPHONE (OUTPATIENT)
Dept: CARDIOLOGY | Facility: MEDICAL CENTER | Age: 64
End: 2019-11-11

## 2019-11-11 ENCOUNTER — OFFICE VISIT (OUTPATIENT)
Dept: CARDIOLOGY | Facility: MEDICAL CENTER | Age: 64
End: 2019-11-11
Payer: COMMERCIAL

## 2019-11-11 VITALS
HEART RATE: 60 BPM | BODY MASS INDEX: 34.85 KG/M2 | WEIGHT: 263 LBS | OXYGEN SATURATION: 94 % | HEIGHT: 73 IN | SYSTOLIC BLOOD PRESSURE: 126 MMHG | DIASTOLIC BLOOD PRESSURE: 80 MMHG

## 2019-11-11 DIAGNOSIS — G47.33 OSA (OBSTRUCTIVE SLEEP APNEA): ICD-10-CM

## 2019-11-11 DIAGNOSIS — I48.0 PAF (PAROXYSMAL ATRIAL FIBRILLATION) (HCC): ICD-10-CM

## 2019-11-11 DIAGNOSIS — Z45.09 ENCOUNTER FOR LOOP RECORDER CHECK: ICD-10-CM

## 2019-11-11 DIAGNOSIS — Z79.01 CHRONIC ANTICOAGULATION: ICD-10-CM

## 2019-11-11 DIAGNOSIS — I63.139 CEREBROVASCULAR ACCIDENT (CVA) DUE TO EMBOLISM OF CAROTID ARTERY, UNSPECIFIED BLOOD VESSEL LATERALITY (HCC): ICD-10-CM

## 2019-11-11 PROCEDURE — 99214 OFFICE O/P EST MOD 30 MIN: CPT | Performed by: INTERNAL MEDICINE

## 2019-11-11 NOTE — TELEPHONE ENCOUNTER
SS asked that today's (11/11/2019) office visit note be faxed to the patients dentist Dr. Alonso. COMPLETE received.

## 2019-11-11 NOTE — PROGRESS NOTES
Arrhythmia Clinic Note (Established patient)    DOS: 2019    Chief complaint/Reason for consult: F/u PAF on ILR    Interval History:  Patient is a 65 yo M. History of CVA that was cryptogenic, s/p ILR. On ILR we detected episodes of sustained AF, longest ~5 minutes or so. Was started on oral anticoagulation thus far tolerating. Here for follow-up. Does complain of dizziness, fatigue, daytime somnolence. Has history of previously diagnosed CELIA but has stopped wearing CPAP.    ROS (+ highlighted in BOLD):  Constitutional: Fevers/chills/fatigue/weightloss  HEENT: Blurry vision/eye pain/sore throat/hearing loss  Respiratory: Shortness of breath/cough  Cardiovascular: Chest pain/palpitations/edema/orthopnea/syncope  GI: Nausea/vomitting/diarrhea  MSK: Arthralgias/myagias/muscle weakness  Skin: Rash/sores  Neurological: Numbness/tremors/vertigo  Endocrine: Excessive thirst/polyuria/cold intolerance/heat intolerance  Psych: Depression/anxiety    Past Medical History:   Diagnosis Date   • Bipolar 1 disorder (HCC)        Past Surgical History:   Procedure Laterality Date   • PB POLYSOMNOGRAPHY W/CPAP  2017            Social History     Socioeconomic History   • Marital status:      Spouse name: Not on file   • Number of children: Not on file   • Years of education: Not on file   • Highest education level: Not on file   Occupational History   • Not on file   Social Needs   • Financial resource strain: Not on file   • Food insecurity:     Worry: Not on file     Inability: Not on file   • Transportation needs:     Medical: Not on file     Non-medical: Not on file   Tobacco Use   • Smoking status: Former Smoker     Types: Cigarettes     Last attempt to quit: 1999     Years since quittin.2   • Smokeless tobacco: Never Used   Substance and Sexual Activity   • Alcohol use: Not on file   • Drug use: Not on file   • Sexual activity: Not on file   Lifestyle   • Physical activity:     Days per week: Not on  file     Minutes per session: Not on file   • Stress: Not on file   Relationships   • Social connections:     Talks on phone: Not on file     Gets together: Not on file     Attends [employer] service: Not on file     Active member of club or organization: Not on file     Attends meetings of clubs or organizations: Not on file     Relationship status: Not on file   • Intimate partner violence:     Fear of current or ex partner: Not on file     Emotionally abused: Not on file     Physically abused: Not on file     Forced sexual activity: Not on file   Other Topics Concern   • Not on file   Social History Narrative   • Not on file       History reviewed. No pertinent family history.    Allergies   Allergen Reactions   • Gabapentin      Other reaction(s): Other (See Comments)  Leg pain and weakness   • Lithium      Visual disturbance   • Morphine Unspecified     Hallucinations       Current Outpatient Medications   Medication Sig Dispense Refill   • ARIPiprazole (ABILIFY PO) Take  by mouth.     • atorvastatin (LIPITOR) 80 MG tablet Take 1 Tab by mouth every evening. 90 Tab 3   • metoprolol SR (TOPROL XL) 25 MG TABLET SR 24 HR Take 1 Tab by mouth every day. 30 Tab 3   • apixaban (ELIQUIS) 5mg Tab Take 1 Tab by mouth 2 Times a Day. 60 Tab 5   • tramadol (ULTRAM) 50 MG Tab Take 50 mg by mouth every 8 hours as needed for Severe Pain.     • venlafaxine (EFFEXOR-XR) 150 MG extended-release capsule Take 150 mg by mouth every day. 150 mg in the morning at 50 mg in the evening.     • venlafaxine (EFFEXOR) 50 MG tablet Take 50 mg by mouth every bedtime.     • omeprazole (PRILOSEC) 20 MG delayed-release capsule Take 20 mg by mouth every day.     • benazepril (LOTENSIN) 5 MG Tab Take 5 mg by mouth every day.     • levothyroxine (SYNTHROID) 200 MCG Tab Take 200 mcg by mouth Every morning on an empty stomach.     • Cholecalciferol (VITAMIN D3) 5000 units Cap Take 1 Cap by mouth every day.     • divalproex (DEPAKOTE) 500 MG Tablet  "Delayed Response Take 1,000 mg by mouth 2 Times a Day.     • alfuzosin (UROXATRAL) 10 MG SR tablet Take 10 mg by mouth every day.       No current facility-administered medications for this visit.        Physical Exam:  Vitals:    11/11/19 1030   BP: 126/80   BP Location: Left arm   Patient Position: Sitting   BP Cuff Size: Adult   Pulse: 60   SpO2: 94%   Weight: 119.3 kg (263 lb)   Height: 1.854 m (6' 1\")     General appearance: NAD, conversant   Eyes: anicteric sclerae, moist conjunctivae; no lid-lag; PERRLA  HENT: Atraumatic; oropharynx clear with moist mucous membranes and no mucosal ulcerations; normal hard and soft palate  Neck: Trachea midline; FROM, supple, no thyromegaly or lymphadenopathy  Lungs: CTA, with normal respiratory effort and no intercostal retractions  CV: RRR, no MRGs, no JVD  Abdomen: Soft, non-tender; no masses or HSM  Extremities: No peripheral edema or extremity lymphadenopathy  Skin: Normal temperature, turgor and texture; no rash, ulcers or subcutaneous nodules  Psych: Appropriate affect, alert and oriented to person, place and time    Data:  Labs reviewed    Prior echo/stress reviewed:  No shunt    Impression/Plan:  1. Encounter for loop recorder check     2. CELIA (obstructive sleep apnea)     3. 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 PAF (paroxysmal atrial fibrillation) ([facility])     5. Chronic anticoagulation       -Loop recorder check showing episodes of sustained AF, short in duration, frequent PVCs  -Tolerating OAC, continue  -I do not think we need to start antiarrhythmics currently  -I think he needs to get his CELIA addressed, he has an appointment early December, will help with arrhythmia burden  -F/u in 6 mo    Babak Stacy MD    Addendum: Patient is cleared for dental surgery. Hold OAC 2 days prior if necessary.  "

## 2019-11-12 ENCOUNTER — NON-PROVIDER VISIT (OUTPATIENT)
Dept: CARDIOLOGY | Facility: MEDICAL CENTER | Age: 64
End: 2019-11-12
Payer: COMMERCIAL

## 2019-11-12 DIAGNOSIS — Z95.818 STATUS POST PLACEMENT OF IMPLANTABLE LOOP RECORDER: ICD-10-CM

## 2019-11-12 DIAGNOSIS — I63.9 CRYPTOGENIC STROKE (HCC): ICD-10-CM

## 2019-11-12 PROCEDURE — 93298 REM INTERROG DEV EVAL SCRMS: CPT | Performed by: INTERNAL MEDICINE

## 2019-11-14 ENCOUNTER — OFFICE VISIT (OUTPATIENT)
Dept: NEUROLOGY | Facility: MEDICAL CENTER | Age: 64
End: 2019-11-14
Payer: COMMERCIAL

## 2019-11-14 VITALS
TEMPERATURE: 96.1 F | DIASTOLIC BLOOD PRESSURE: 68 MMHG | HEIGHT: 73 IN | BODY MASS INDEX: 34.59 KG/M2 | RESPIRATION RATE: 20 BRPM | WEIGHT: 261 LBS | SYSTOLIC BLOOD PRESSURE: 116 MMHG | OXYGEN SATURATION: 96 % | HEART RATE: 76 BPM

## 2019-11-14 DIAGNOSIS — I48.0 PAROXYSMAL A-FIB (HCC): ICD-10-CM

## 2019-11-14 DIAGNOSIS — E78.5 DYSLIPIDEMIA (HIGH LDL; LOW HDL): ICD-10-CM

## 2019-11-14 DIAGNOSIS — Z09 HOSPITAL DISCHARGE FOLLOW-UP: ICD-10-CM

## 2019-11-14 DIAGNOSIS — I10 ESSENTIAL HYPERTENSION: ICD-10-CM

## 2019-11-14 DIAGNOSIS — I63.9 ISCHEMIC STROKE (HCC): ICD-10-CM

## 2019-11-14 DIAGNOSIS — I70.90 ATHEROSCLEROSIS: ICD-10-CM

## 2019-11-14 DIAGNOSIS — Z13.31 SCREENING FOR DEPRESSION: ICD-10-CM

## 2019-11-14 PROCEDURE — 99205 OFFICE O/P NEW HI 60 MIN: CPT | Performed by: NURSE PRACTITIONER

## 2019-11-14 SDOH — HEALTH STABILITY: MENTAL HEALTH: HOW OFTEN DO YOU HAVE A DRINK CONTAINING ALCOHOL?: NEVER

## 2019-11-14 ASSESSMENT — PATIENT HEALTH QUESTIONNAIRE - PHQ9
SUM OF ALL RESPONSES TO PHQ QUESTIONS 1-9: 25
CLINICAL INTERPRETATION OF PHQ2 SCORE: 6
5. POOR APPETITE OR OVEREATING: 3 - NEARLY EVERY DAY

## 2019-11-14 NOTE — PROGRESS NOTES
Chief Complaint   Patient presents with   • New Patient     stroke       Problem List Items Addressed This Visit     None      Visit Diagnoses     Ischemic stroke (HCC)        Essential hypertension        Dyslipidemia (high LDL; low HDL)        Paroxysmal A-fib (HCC)        Screening for depression        Hospital discharge follow-up              History of present illness:  Jeremi Ingram 64 y.o. male presents today with wife for stroke bridge f/u.     He was seen in the ER in [facility] for c/o left arm numbness and weakness. He was given TPA and while being care flighted to [facility], pt reports that his symptoms improved. He was told he had a stroke. MRI brain showed multifocal small cortical infarcts in the right frontal, parietal and temporal lobes. He was discharged on plavix and asa but was switched to eliquis 5mg BID d/t evidence of afib during heart monitoring. No side effects. Wife reports that cardiology had recommended a loop recorder for monitoring and that he may need to be checked for sleep apnea.     Pt had completed sessions with physical therapy. He feels great. Symptoms have resolved and denies any more weakness.     He used to drink alcohol and smoke cigarettes. He quit years ago. He uses marijuana for his shoulder pain and has a medical card.     Mood is good. No SI  or HI      Past medical history:   Past Medical History:   Diagnosis Date   • Bipolar 1 disorder (HCC)        Past surgical history:   Past Surgical History:   Procedure Laterality Date   • PB POLYSOMNOGRAPHY W/CPAP  4/6/2017            Family history:   History reviewed. No pertinent family history.    Social history:   Social History     Socioeconomic History   • Marital status:      Spouse name: Not on file   • Number of children: Not on file   • Years of education: Not on file   • Highest education level: Not on file   Occupational History   • Not on file   Social Needs   • Financial resource strain: Not on file   • Food  insecurity:     Worry: Not on file     Inability: Not on file   • Transportation needs:     Medical: Not on file     Non-medical: Not on file   Tobacco Use   • Smoking status: Former Smoker     Types: Cigarettes     Last attempt to quit: 1999     Years since quittin.2   • Smokeless tobacco: Never Used   Substance and Sexual Activity   • Alcohol use: Not on file   • Drug use: Not on file   • Sexual activity: Not on file   Lifestyle   • Physical activity:     Days per week: Not on file     Minutes per session: Not on file   • Stress: Not on file   Relationships   • Social connections:     Talks on phone: Not on file     Gets together: Not on file     Attends Religion service: Not on file     Active member of club or organization: Not on file     Attends meetings of clubs or organizations: Not on file     Relationship status: Not on file   • Intimate partner violence:     Fear of current or ex partner: Not on file     Emotionally abused: Not on file     Physically abused: Not on file     Forced sexual activity: Not on file   Other Topics Concern   • Not on file   Social History Narrative   • Not on file       Current medications:   Current Outpatient Medications   Medication   • ARIPiprazole (ABILIFY PO)   • atorvastatin (LIPITOR) 80 MG tablet   • metoprolol SR (TOPROL XL) 25 MG TABLET SR 24 HR   • apixaban (ELIQUIS) 5mg Tab   • tramadol (ULTRAM) 50 MG Tab   • divalproex (DEPAKOTE) 500 MG Tablet Delayed Response   • venlafaxine (EFFEXOR-XR) 150 MG extended-release capsule   • venlafaxine (EFFEXOR) 50 MG tablet   • omeprazole (PRILOSEC) 20 MG delayed-release capsule   • benazepril (LOTENSIN) 5 MG Tab   • levothyroxine (SYNTHROID) 200 MCG Tab   • alfuzosin (UROXATRAL) 10 MG SR tablet   • Cholecalciferol (VITAMIN D3) 5000 units Cap     No current facility-administered medications for this visit.        Medication Allergy:  Allergies   Allergen Reactions   • Gabapentin      Other reaction(s): Other (See  "Comments)  Leg pain and weakness   • Lithium      Visual disturbance   • Morphine Unspecified     Hallucinations       Review of systems:   General: Denies fevers or chills, or nightsweats, or generalized fatigue.    Head: Denies headaches or dizziness or lightheadedness  EENT: Denies vision changes, vision loss or pain, nasal secretion, nasal bleeding, difficulty swallowing, hearing loss, tinnitus, vertigo, ear pain  Respiratory: Denies shortness of breath, cough, sputum, or wheezing  Cardiac: Denies chest pain, palpitations, edema or syncope  Gastrointestinal: Denies nausea, vomiting, no abdominal pain or change in bowel habits, no melena or hematochezia  Urinary: Denies dysuria, frequency, hesitancy, or incontinence.  Dermatologic:  Denies new rash  Musculoskeletal: Denies muscle pain or swelling, no atrophy, no neck and back pain or stiffness.   Neurologic: Denies facial droopiness,  ataxia, change in speech or language, memory loss, abnormal movements, seizures, loss of consciousness, or episodes of confusion.   Psychiatric: Denies anxiety, depression, mood swings, suicidal or homicidal thoughts       Physical examination:   Vitals:    11/14/19 1100   BP: 116/68   BP Location: Right arm   Patient Position: Sitting   BP Cuff Size: Adult   Pulse: 76   Resp: 20   Temp: (!) 35.6 °C (96.1 °F)   TempSrc: Temporal   SpO2: 96%   Weight: 118.4 kg (261 lb)   Height: 1.854 m (6' 1\")     General: Patient in no acute distress, pleasant and cooperative.  HEENT: Normocephalic, no signs of acute trauma.   moist conjunctivae. Nares are patent. Oropharynx clear without lesions and normal  hard and soft palates.   Neck: Supple, No carotid bruits. There is normal range of motion.   Resp: clear to auscultation bilaterally. No wheezes or crackles.   CV: RRR, no murmurs.    Skin: no signs of acute rashes or trauma.   Musculoskeletal: joints exhibit full range of motion, without any pain to palpation. There are no signs of joint or " muscle swelling. There is no tenderness to deep palpation of muscles.   Psychiatric: No hallucinatory behavior. No symptoms of depression or suicidal ideation. Mood and affect appear normal on exam.     NEUROLOGICAL EXAM:   Mental status, orientation: Awake, alert and fully oriented.   Speech and language: speech is clear and fluent. The patient is able to name, repeat and comprehend.   Memory: There is intact recollection of recent and remote events.   Cranial nerve exam:   CN I: Not examined   CN II: PERRL.   CN III, IV, VI: EOMI; no nystagmus   CN V: Facial sensation intact bilaterally   CN VII: face symmetric   CN VIII: hearing intact to finger rub bilaterally   CN IX, X: palate elevates symmetrically   CN XI: Symmetric shoulder shrug  CN XII: tongue midline. No signs of tongue biting or fasciculations   Motor exam: Strength is 5/5 in all extremities. Tone is normal. No abnormal movements were seen on exam.   Sensory exam reveals normal sense of light touch in all extremities.   Deep tendon reflexes:  2+ throughout. Plantar responses are flexor. There is no clonus.   Coordination: shows a normal finger-nose-finger. Normal rapidly alternating movements.   Gait: The patient was able to get up from seated position on first attempt without requiring assistance. Found to be steady when walking. Movements were fluid with normal arm swing. The patient was able to turn without difficulties or tendency to fall. Romberg exam swaying      ANCILLARY DATA REVIEWED:     Lab Data Review:  Reviewed in chart. TSH, CBC, BMP, HgA1c, Lipid,     Records reviewed:  Reviewed in chart.    Imaging:  MRI Brain 8/26/2019  1.  Multifocal small cortical infarcts in the right frontal, parietal and temporal lobes.  2.  Mild cerebral atrophy.  3.  Mild chronic microvascular ischemic disease.    ECHO 8/26/2019  CONCLUSIONS  No prior study is available for comparison.   Normal left ventricular systolic function.  Left ventricular ejection  fraction is visually estimated to be 65%.  No evidence of right to left shunt by agitated saline challenge.  No significant valve abnormalities.   Estimated right ventricular systolic pressure is 20 mmHg.  A definite cardiac source for a systemic thromboembolic event is not   identified, failure to do so does not exclude its presence. If   clinically indicated, a transesophageal study would be useful.     US carotid doppler bilateral 8/26/2019  CONCLUSIONS   Atherosclerosis without significant stenosis.           ASSESSMENT AND PLAN:    1. Ischemic stroke (HCC)    2. Essential hypertension    3. Dyslipidemia (high LDL; low HDL)    4. Paroxysmal A-fib (HCC)    5. Screening for depression    6. Hospital discharge follow-up    7. Atherosclerosis      CLINICAL DECISION:  Pt had acute infarcts in the cortical region in the right hemisphere in August.  This was likely cardioembolic secondary to PAF. Pt had evidence of PAF during heart monitoring and is already f/u with cardiology. His ASA and plavix were d/c'd and he was started on eliquis 5mg BID. No side effects. Pt had regained his strength in the left arm. No residual deficit. Stroke education given. Pt has other risk factors for stroke including HTN, DLD, and obesity. He takes his cholesterol and BP meds daily. He doesn't have diabetes and is not taking any medication for this. HgA1c was 5.2. Pt used to smoke cigarettes. Quit years ago.      Plan:  -Continue current medications. Discussed compliance. Aware of bleeding risk.     -Education given to help control risk factors including:  /80, LDL 70 or less.    -Needs close fu with PCP for optimal control of risk factors.     -Diet, exercise and weight loss.     -Continue f/u with cardiology.    Discussed case with Dr. Baumann and we both agree with this plan.         FOLLOW-UP:   Return for no further.        EDUCATION AND COUNSELING:  -Usefulness of anti-platelets in secondary stroke prevention was discussed. The  side effects, including increased risk for bleeding (both traumatic and spontaneous) were reviewed with the patient at length.   -Recommended goal for LDL is 70 or less. Side effects of statin, including idiosyncratic reactions as well as more common myalgias, and liver injury  were discussed. Monitoring of LFT’s will be deferred to the patient’s primary care physician.   -Secondary stroke prevention education was provided, including; lifestyle modification with healthy diet, and exercise, as well as recommendations for optimal control of risk factors.   -Close follow up with PCP is recommended.   -Compliance with medications was discussed in detail.   -Smoking cessation education was provided for greater than 3 minutes.   -The patient/family was educated on recognition of stroke symptoms. The patient/family instructed to call 911 EMERGENTLY upon presentation of any of these symptoms/signs, to be taken to nearest emergency department for evaluation and acute care.       The patient understands and agrees that due to the complexity of his/her diagnosis, results of any testing and further recommendations will typically be discussed/made during a face to face encounter in my office. The patient and/or family further understands it is their responsibility to keep proper follow up.         Shruthi Del Angel, MSN, APRN, FNP-C  [facility]  Office: [phone]  Fax: [phone]

## 2019-12-12 ENCOUNTER — NON-PROVIDER VISIT (OUTPATIENT)
Dept: CARDIOLOGY | Facility: MEDICAL CENTER | Age: 64
End: 2019-12-12
Payer: COMMERCIAL

## 2019-12-12 DIAGNOSIS — Z95.818 STATUS POST PLACEMENT OF IMPLANTABLE LOOP RECORDER: ICD-10-CM

## 2019-12-12 DIAGNOSIS — I63.9 CRYPTOGENIC STROKE (HCC): ICD-10-CM

## 2019-12-12 DIAGNOSIS — I10 PRIMARY HYPERTENSION: Primary | ICD-10-CM

## 2019-12-12 PROCEDURE — 93298 REM INTERROG DEV EVAL SCRMS: CPT | Performed by: INTERNAL MEDICINE

## 2019-12-23 RX ORDER — METOPROLOL SUCCINATE 25 MG/1
25 TABLET, EXTENDED RELEASE ORAL DAILY
Qty: 90 TAB | Refills: 3 | Status: SHIPPED | OUTPATIENT
Start: 2019-12-23

## 2020-01-14 ENCOUNTER — NON-PROVIDER VISIT (OUTPATIENT)
Dept: CARDIOLOGY | Facility: MEDICAL CENTER | Age: 65
End: 2020-01-14
Payer: COMMERCIAL

## 2020-01-14 DIAGNOSIS — R55 SYNCOPE AND COLLAPSE: ICD-10-CM

## 2020-01-14 DIAGNOSIS — Z95.818 STATUS POST PLACEMENT OF IMPLANTABLE LOOP RECORDER: ICD-10-CM

## 2020-01-14 PROCEDURE — 93298 REM INTERROG DEV EVAL SCRMS: CPT | Performed by: INTERNAL MEDICINE

## 2020-02-14 ENCOUNTER — NON-PROVIDER VISIT (OUTPATIENT)
Dept: CARDIOLOGY | Facility: MEDICAL CENTER | Age: 65
End: 2020-02-14
Payer: COMMERCIAL

## 2020-02-14 DIAGNOSIS — I48.0 PAF (PAROXYSMAL ATRIAL FIBRILLATION) (HCC): ICD-10-CM

## 2020-02-14 DIAGNOSIS — Z95.818 STATUS POST PLACEMENT OF IMPLANTABLE LOOP RECORDER: ICD-10-CM

## 2020-02-14 DIAGNOSIS — I63.9 CRYPTOGENIC STROKE (HCC): ICD-10-CM

## 2020-02-14 PROCEDURE — 93298 REM INTERROG DEV EVAL SCRMS: CPT | Performed by: INTERNAL MEDICINE

## 2020-02-14 RX ORDER — APIXABAN 5 MG/1
TABLET, FILM COATED ORAL
Qty: 180 TAB | Refills: 3 | Status: SHIPPED | OUTPATIENT
Start: 2020-02-14 | End: 2020-04-30 | Stop reason: SDUPTHER

## 2020-03-26 ENCOUNTER — NON-PROVIDER VISIT (OUTPATIENT)
Dept: CARDIOLOGY | Facility: MEDICAL CENTER | Age: 65
End: 2020-03-26
Payer: COMMERCIAL

## 2020-03-30 ENCOUNTER — NON-PROVIDER VISIT (OUTPATIENT)
Dept: CARDIOLOGY | Facility: MEDICAL CENTER | Age: 65
End: 2020-03-30
Payer: COMMERCIAL

## 2020-03-30 DIAGNOSIS — I63.9 CRYPTOGENIC STROKE (HCC): ICD-10-CM

## 2020-03-30 DIAGNOSIS — Z95.818 STATUS POST PLACEMENT OF IMPLANTABLE LOOP RECORDER: ICD-10-CM

## 2020-03-30 PROCEDURE — 93298 REM INTERROG DEV EVAL SCRMS: CPT | Performed by: INTERNAL MEDICINE

## 2020-04-29 ENCOUNTER — NON-PROVIDER VISIT (OUTPATIENT)
Dept: CARDIOLOGY | Facility: MEDICAL CENTER | Age: 65
End: 2020-04-29
Payer: COMMERCIAL

## 2020-04-29 DIAGNOSIS — I63.9 CRYPTOGENIC STROKE (HCC): ICD-10-CM

## 2020-04-29 DIAGNOSIS — Z95.818 STATUS POST PLACEMENT OF IMPLANTABLE LOOP RECORDER: ICD-10-CM

## 2020-04-29 PROCEDURE — 93298 REM INTERROG DEV EVAL SCRMS: CPT | Performed by: INTERNAL MEDICINE

## 2020-04-30 DIAGNOSIS — I48.0 PAF (PAROXYSMAL ATRIAL FIBRILLATION) (HCC): ICD-10-CM

## 2020-05-04 ENCOUNTER — TELEMEDICINE (OUTPATIENT)
Dept: CARDIOLOGY | Facility: MEDICAL CENTER | Age: 65
End: 2020-05-04
Payer: COMMERCIAL

## 2020-05-04 VITALS
WEIGHT: 243 LBS | BODY MASS INDEX: 32.2 KG/M2 | DIASTOLIC BLOOD PRESSURE: 84 MMHG | HEIGHT: 73 IN | SYSTOLIC BLOOD PRESSURE: 105 MMHG | HEART RATE: 74 BPM

## 2020-05-04 DIAGNOSIS — I48.0 PAROXYSMAL ATRIAL FIBRILLATION (HCC): ICD-10-CM

## 2020-05-04 DIAGNOSIS — Z79.01 CHRONIC ANTICOAGULATION: ICD-10-CM

## 2020-05-04 DIAGNOSIS — G47.33 OSA (OBSTRUCTIVE SLEEP APNEA): ICD-10-CM

## 2020-05-04 DIAGNOSIS — I10 PRIMARY HYPERTENSION: ICD-10-CM

## 2020-05-04 DIAGNOSIS — I63.139 CEREBROVASCULAR ACCIDENT (CVA) DUE TO EMBOLISM OF CAROTID ARTERY, UNSPECIFIED BLOOD VESSEL LATERALITY (HCC): ICD-10-CM

## 2020-05-04 DIAGNOSIS — R42 ORTHOSTATIC DIZZINESS: ICD-10-CM

## 2020-05-04 PROCEDURE — 99214 OFFICE O/P EST MOD 30 MIN: CPT | Mod: 95,CR | Performed by: INTERNAL MEDICINE

## 2020-05-04 RX ORDER — M-VIT,TX,IRON,MINS/CALC/FOLIC 27MG-0.4MG
1 TABLET ORAL DAILY
COMMUNITY

## 2020-05-04 RX ORDER — NITROGLYCERIN 0.4 MG/1
TABLET SUBLINGUAL
Status: ON HOLD | COMMUNITY
Start: 2019-11-27 | End: 2023-05-26

## 2020-05-04 RX ORDER — DOXYCYCLINE HYCLATE 100 MG/1
100 CAPSULE ORAL
COMMUNITY
Start: 2017-06-29 | End: 2021-05-17

## 2020-05-04 NOTE — PROGRESS NOTES
This encounter was conducted via [employer].   Verbal consent was obtained. Patient's identity was verified.    Arrhythmia Clinic Note (Established patient)    DOS: 2020    Chief complaint/Reason for consult: F/u AF    Interval History:  Patient is a 64 yo M. History of CELIA, currently not wearing CPAP, awaiting repeat sleep study, prior history of stroke, s/p ILR diagnosing sustained AF, no on anticoagulation. Tolerating without overt bleeding issues. Does complain of persistent lightheadedness with standing. This has been chronic issue for him.    ROS (+ highlighted in red):  General--Negative for fatigue, weight loss or weight gain  Cardiovascular--Negative for CP, orthopnea, PND    Past Medical History:   Diagnosis Date   • Bipolar 1 disorder (HCC)        Past Surgical History:   Procedure Laterality Date   • PB POLYSOMNOGRAPHY W/CPAP  2017            Social History     Socioeconomic History   • Marital status:      Spouse name: Not on file   • Number of children: Not on file   • Years of education: Not on file   • Highest education level: Not on file   Occupational History   • Not on file   Social Needs   • Financial resource strain: Not on file   • Food insecurity     Worry: Not on file     Inability: Not on file   • Transportation needs     Medical: Not on file     Non-medical: Not on file   Tobacco Use   • Smoking status: Former Smoker     Packs/day: 0.00     Types: Cigarettes     Last attempt to quit: 1999     Years since quittin.7   • Smokeless tobacco: Never Used   Substance and Sexual Activity   • Alcohol use: Never     Frequency: Never   • Drug use: Yes     Types: Marijuana     Comment: daily   • Sexual activity: Not on file   Lifestyle   • Physical activity     Days per week: Not on file     Minutes per session: Not on file   • Stress: Not on file   Relationships   • Social connections     Talks on phone: Not on file     Gets together: Not on file     Attends Synagogue service:  Not on file     Active member of club or organization: Not on file     Attends meetings of clubs or organizations: Not on file     Relationship status: Not on file   • Intimate partner violence     Fear of current or ex partner: Not on file     Emotionally abused: Not on file     Physically abused: Not on file     Forced sexual activity: Not on file   Other Topics Concern   • Not on file   Social History Narrative   • Not on file       No family history on file.    Allergies   Allergen Reactions   • Gabapentin      Other reaction(s): Other (See Comments)  Leg pain and weakness   • Lithium      Visual disturbance   • Morphine Unspecified     Hallucinations       Current Outpatient Medications   Medication Sig Dispense Refill   • nitroglycerin (NITROSTAT) 0.4 MG SL Tab DISSOLVE 1 TABLET UNDER THE TONGUE EVERY 5 MINUTES AS NEEDED FOR CHEST PAIN. DO NOT EXCEED A TOTAL OF 3 DOSES IN 15 MINUTES. CALL 911 IF NO     • doxycycline (VIBRAMYCIN) 100 MG Cap Take 100 mg by mouth.     • therapeutic multivitamin-minerals (THERAGRAN-M) Tab Take 1 Tab by mouth every day.     • apixaban (ELIQUIS) 5mg Tab Take 1 Tab by mouth 2 Times a Day. 180 Tab 2   • metoprolol SR (TOPROL XL) 25 MG TABLET SR 24 HR Take 1 Tab by mouth every day. 90 Tab 3   • ARIPiprazole (ABILIFY PO) Take  by mouth.     • atorvastatin (LIPITOR) 80 MG tablet Take 1 Tab by mouth every evening. 90 Tab 3   • tramadol (ULTRAM) 50 MG Tab Take 50 mg by mouth every 8 hours as needed for Severe Pain.     • venlafaxine (EFFEXOR-XR) 150 MG extended-release capsule Take 150 mg by mouth every day. 150 mg in the morning at 50 mg in the evening.     • venlafaxine (EFFEXOR) 50 MG tablet Take 50 mg by mouth every bedtime.     • omeprazole (PRILOSEC) 20 MG delayed-release capsule Take 20 mg by mouth every day.     • benazepril (LOTENSIN) 5 MG Tab Take 5 mg by mouth every day.     • levothyroxine (SYNTHROID) 200 MCG Tab Take 100 mcg by mouth Every morning on an empty stomach.     •  "alfuzosin (UROXATRAL) 10 MG SR tablet Take 10 mg by mouth every day.     • Cholecalciferol (VITAMIN D3) 5000 units Cap Take 1 Cap by mouth every day.     • divalproex (DEPAKOTE) 500 MG Tablet Delayed Response Take 1,000 mg by mouth 2 Times a Day.       No current facility-administered medications for this visit.        Physical Exam:  Vitals:    05/04/20 1104   BP: 105/84   BP Location: Left arm   Patient Position: Sitting   BP Cuff Size: Large adult   Pulse: 74   Weight: 110.2 kg (243 lb)   Height: 1.854 m (6' 1\")     General appearance: NAD, conversant  Psych: Alert and oriented to person, place and time    Data:  Labs reviewed    ILR reviewed, 0% AF burden    Impression/Plan:  1. Primary hypertension     2. 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 CELIA (obstructive sleep apnea)     4. Orthostatic dizziness     5. Chronic anticoagulation  Basic Metabolic Panel   6. Paroxysmal atrial fibrillation (HCC)  Basic Metabolic Panel     -Maintains minimal to no AF burden on device  -I suggested he hold his alpha blocker briefly to see if his orthostatic symptoms improve  -Continue OAC, check CBC/renal function  -Encouraged him to f/u with his pulmonologist for repeat sleep study  -F/u in 6 mo    Babak Stacy MD    "

## 2020-05-29 ENCOUNTER — NON-PROVIDER VISIT (OUTPATIENT)
Dept: CARDIOLOGY | Facility: MEDICAL CENTER | Age: 65
End: 2020-05-29
Payer: COMMERCIAL

## 2020-05-29 DIAGNOSIS — I63.9 CRYPTOGENIC STROKE (HCC): ICD-10-CM

## 2020-05-29 DIAGNOSIS — Z95.818 STATUS POST PLACEMENT OF IMPLANTABLE LOOP RECORDER: ICD-10-CM

## 2020-05-29 PROCEDURE — 93298 REM INTERROG DEV EVAL SCRMS: CPT | Performed by: INTERNAL MEDICINE

## 2020-06-16 ENCOUNTER — TELEPHONE (OUTPATIENT)
Dept: CARDIOLOGY | Facility: MEDICAL CENTER | Age: 65
End: 2020-06-16

## 2020-06-16 NOTE — TELEPHONE ENCOUNTER
Spoke with pt's wife in regards to noise on pt's loop recorder on 6/8/2020, wife advised he had a MRI done that same day in the [place] area. Advised her to let pt know of manual transmission, and if they need anything to give a call back !   Pt's wife appreciative for the call.

## 2020-06-29 ENCOUNTER — TELEPHONE (OUTPATIENT)
Dept: CARDIOLOGY | Facility: MEDICAL CENTER | Age: 65
End: 2020-06-29

## 2020-06-29 NOTE — TELEPHONE ENCOUNTER
Remote Transmission 6/29/2020  ? Of 4 SVT vs AF episodes between 6/24/2020-6/28/2020 lasting 22-58 seconds ~ 154 bpm  Pt on OAC.   To be scanned into media for review.

## 2020-06-30 NOTE — TELEPHONE ENCOUNTER
Pt noticed fast hr and a little pressure. Some dizziness when standing then would resolve quickly. Symptoms have resolved. Advised would call back if any changes to POC

## 2020-07-01 ENCOUNTER — NON-PROVIDER VISIT (OUTPATIENT)
Dept: CARDIOLOGY | Facility: MEDICAL CENTER | Age: 65
End: 2020-07-01
Payer: COMMERCIAL

## 2020-07-01 DIAGNOSIS — Z95.818 STATUS POST PLACEMENT OF IMPLANTABLE LOOP RECORDER: ICD-10-CM

## 2020-07-01 DIAGNOSIS — I63.9 CRYPTOGENIC STROKE (HCC): ICD-10-CM

## 2020-07-01 PROCEDURE — 93298 REM INTERROG DEV EVAL SCRMS: CPT | Performed by: INTERNAL MEDICINE

## 2020-08-04 ENCOUNTER — NON-PROVIDER VISIT (OUTPATIENT)
Dept: CARDIOLOGY | Facility: MEDICAL CENTER | Age: 65
End: 2020-08-04
Payer: COMMERCIAL

## 2020-08-04 DIAGNOSIS — I63.9 CRYPTOGENIC STROKE (HCC): ICD-10-CM

## 2020-08-04 DIAGNOSIS — Z95.818 STATUS POST PLACEMENT OF IMPLANTABLE LOOP RECORDER: ICD-10-CM

## 2020-08-04 PROCEDURE — 93298 REM INTERROG DEV EVAL SCRMS: CPT | Performed by: INTERNAL MEDICINE

## 2020-09-04 ENCOUNTER — NON-PROVIDER VISIT (OUTPATIENT)
Dept: CARDIOLOGY | Facility: MEDICAL CENTER | Age: 65
End: 2020-09-04
Payer: COMMERCIAL

## 2020-09-04 DIAGNOSIS — Z95.818 STATUS POST PLACEMENT OF IMPLANTABLE LOOP RECORDER: ICD-10-CM

## 2020-09-04 DIAGNOSIS — I63.9 CRYPTOGENIC STROKE (HCC): ICD-10-CM

## 2020-09-04 PROCEDURE — 93298 REM INTERROG DEV EVAL SCRMS: CPT | Performed by: INTERNAL MEDICINE

## 2020-09-28 ENCOUNTER — TELEPHONE (OUTPATIENT)
Dept: CARDIOLOGY | Facility: MEDICAL CENTER | Age: 65
End: 2020-09-28

## 2020-09-28 NOTE — TELEPHONE ENCOUNTER
Remote Transmission 9/28/2020     SVT episode on 9/27/2020 @ 2:18pm lasting 21 seconds ~ 162 bpm     Scanned into media for review.

## 2020-10-02 ENCOUNTER — TELEPHONE (OUTPATIENT)
Dept: CARDIOLOGY | Facility: MEDICAL CENTER | Age: 65
End: 2020-10-02

## 2020-10-02 NOTE — TELEPHONE ENCOUNTER
Remote Transmission 10/2/2020:    1-SVT episode 10/1/2020@5:29pm lasting 4 min and 55 sec~150bpm.    Scanned into media.

## 2020-10-02 NOTE — TELEPHONE ENCOUNTER
"Pt and his wife returned call. Pt was asymptomatic. However, they did want us to be aware that pt has been \"shaky\" lately, not specific to this episode. They have appt w/ PCP soon to address this.   "

## 2020-10-05 ENCOUNTER — NON-PROVIDER VISIT (OUTPATIENT)
Dept: CARDIOLOGY | Facility: MEDICAL CENTER | Age: 65
End: 2020-10-05
Payer: COMMERCIAL

## 2020-10-05 DIAGNOSIS — I63.9 CRYPTOGENIC STROKE (HCC): ICD-10-CM

## 2020-10-05 DIAGNOSIS — Z95.818 STATUS POST PLACEMENT OF IMPLANTABLE LOOP RECORDER: ICD-10-CM

## 2020-10-05 PROCEDURE — 93298 REM INTERROG DEV EVAL SCRMS: CPT | Performed by: INTERNAL MEDICINE

## 2020-11-02 ENCOUNTER — TELEPHONE (OUTPATIENT)
Dept: CARDIOLOGY | Facility: MEDICAL CENTER | Age: 65
End: 2020-11-02

## 2020-11-02 ENCOUNTER — TELEMEDICINE (OUTPATIENT)
Dept: CARDIOLOGY | Facility: MEDICAL CENTER | Age: 65
End: 2020-11-02
Payer: COMMERCIAL

## 2020-11-02 VITALS
DIASTOLIC BLOOD PRESSURE: 75 MMHG | OXYGEN SATURATION: 92 % | SYSTOLIC BLOOD PRESSURE: 122 MMHG | WEIGHT: 256 LBS | HEART RATE: 64 BPM | BODY MASS INDEX: 33.93 KG/M2 | HEIGHT: 73 IN

## 2020-11-02 DIAGNOSIS — R25.1 TREMOR: ICD-10-CM

## 2020-11-02 DIAGNOSIS — I63.139 CEREBROVASCULAR ACCIDENT (CVA) DUE TO EMBOLISM OF CAROTID ARTERY, UNSPECIFIED BLOOD VESSEL LATERALITY (HCC): ICD-10-CM

## 2020-11-02 DIAGNOSIS — Z45.09 ENCOUNTER FOR LOOP RECORDER CHECK: ICD-10-CM

## 2020-11-02 PROCEDURE — 99214 OFFICE O/P EST MOD 30 MIN: CPT | Mod: 95,CR | Performed by: INTERNAL MEDICINE

## 2020-11-10 ENCOUNTER — NON-PROVIDER VISIT (OUTPATIENT)
Dept: CARDIOLOGY | Facility: MEDICAL CENTER | Age: 65
End: 2020-11-10
Payer: COMMERCIAL

## 2020-11-10 DIAGNOSIS — Z95.818 STATUS POST PLACEMENT OF IMPLANTABLE LOOP RECORDER: ICD-10-CM

## 2020-11-10 DIAGNOSIS — I63.9 CRYPTOGENIC STROKE (HCC): ICD-10-CM

## 2020-11-10 PROCEDURE — 93298 REM INTERROG DEV EVAL SCRMS: CPT | Performed by: INTERNAL MEDICINE

## 2020-12-03 ENCOUNTER — OFFICE VISIT (OUTPATIENT)
Dept: NEUROLOGY | Facility: MEDICAL CENTER | Age: 65
End: 2020-12-03
Payer: COMMERCIAL

## 2020-12-03 VITALS
HEIGHT: 73 IN | HEART RATE: 66 BPM | DIASTOLIC BLOOD PRESSURE: 74 MMHG | WEIGHT: 264.55 LBS | SYSTOLIC BLOOD PRESSURE: 126 MMHG | BODY MASS INDEX: 35.06 KG/M2 | TEMPERATURE: 97.6 F | OXYGEN SATURATION: 97 %

## 2020-12-03 DIAGNOSIS — G20.C PARKINSONISM, UNSPECIFIED PARKINSONISM TYPE (HCC): ICD-10-CM

## 2020-12-03 DIAGNOSIS — G21.19 DRUG-INDUCED PARKINSONISM (HCC): ICD-10-CM

## 2020-12-03 PROCEDURE — 99204 OFFICE O/P NEW MOD 45 MIN: CPT | Performed by: PSYCHIATRY & NEUROLOGY

## 2020-12-03 ASSESSMENT — PATIENT HEALTH QUESTIONNAIRE - PHQ9: CLINICAL INTERPRETATION OF PHQ2 SCORE: 0
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
## 2020-12-09 ENCOUNTER — NON-PROVIDER VISIT (OUTPATIENT)
Dept: CARDIOLOGY | Facility: MEDICAL CENTER | Age: 65
End: 2020-12-09
Payer: COMMERCIAL

## 2020-12-09 DIAGNOSIS — Z95.818 STATUS POST PLACEMENT OF IMPLANTABLE LOOP RECORDER: ICD-10-CM

## 2020-12-09 DIAGNOSIS — I63.9 CRYPTOGENIC STROKE (HCC): ICD-10-CM

## 2020-12-09 PROCEDURE — 99999 PR NO CHARGE: CPT | Performed by: INTERNAL MEDICINE

## 2020-12-09 PROCEDURE — 93298 REM INTERROG DEV EVAL SCRMS: CPT | Performed by: INTERNAL MEDICINE

## 2020-12-21 ENCOUNTER — TELEPHONE (OUTPATIENT)
Dept: CARDIOLOGY | Facility: MEDICAL CENTER | Age: 65
End: 2020-12-21

## 2020-12-21 NOTE — TELEPHONE ENCOUNTER
SS    NM: Yuli   HOSP: [facility]    PH: [phone]  EXT: [id number]   PT NM: Jeremi Ingram   : 57   REG DR: Dr Stacy   RE: Needs to get patient cleared for  surgery (this is the 3rd attempt)   DISP HIST: 2020 02:16P AA  P/Disp  2020 02:28P [facility] chkd    --------------------------------------  Message History  Account: [id number]  Taken:  -Dec-2020  2:19p AA  Serial#: [id number]      Thank you,  Cheyenne LEVIN

## 2021-01-13 ENCOUNTER — NON-PROVIDER VISIT (OUTPATIENT)
Dept: CARDIOLOGY | Facility: MEDICAL CENTER | Age: 66
End: 2021-01-13
Payer: COMMERCIAL

## 2021-01-13 DIAGNOSIS — I63.9 CRYPTOGENIC STROKE (HCC): ICD-10-CM

## 2021-01-13 DIAGNOSIS — Z95.818 STATUS POST PLACEMENT OF IMPLANTABLE LOOP RECORDER: ICD-10-CM

## 2021-01-13 PROCEDURE — 93298 REM INTERROG DEV EVAL SCRMS: CPT | Performed by: INTERNAL MEDICINE

## 2021-01-21 ENCOUNTER — TELEPHONE (OUTPATIENT)
Dept: CARDIOLOGY | Facility: MEDICAL CENTER | Age: 66
End: 2021-01-21

## 2021-01-21 NOTE — TELEPHONE ENCOUNTER
Remote Transmission 1/21/2021    ? SVT vs AFL episode on 1/20/2021 @ 4:02pm lasting 10 minutes ~ 150 bpm.     Scanned into media for review.

## 2021-01-25 DIAGNOSIS — I48.0 PAF (PAROXYSMAL ATRIAL FIBRILLATION) (HCC): ICD-10-CM

## 2021-01-26 RX ORDER — APIXABAN 5 MG/1
TABLET, FILM COATED ORAL
Qty: 180 TAB | Refills: 1 | Status: SHIPPED | OUTPATIENT
Start: 2021-01-26 | End: 2021-05-18 | Stop reason: SDUPTHER

## 2021-02-17 ENCOUNTER — NON-PROVIDER VISIT (OUTPATIENT)
Dept: CARDIOLOGY | Facility: MEDICAL CENTER | Age: 66
End: 2021-02-17
Payer: COMMERCIAL

## 2021-02-17 DIAGNOSIS — I63.9 CRYPTOGENIC STROKE (HCC): ICD-10-CM

## 2021-02-17 DIAGNOSIS — Z95.818 STATUS POST PLACEMENT OF IMPLANTABLE LOOP RECORDER: ICD-10-CM

## 2021-02-17 PROCEDURE — 93298 REM INTERROG DEV EVAL SCRMS: CPT | Performed by: INTERNAL MEDICINE

## 2021-03-18 ENCOUNTER — TELEPHONE (OUTPATIENT)
Dept: CARDIOLOGY | Facility: MEDICAL CENTER | Age: 66
End: 2021-03-18

## 2021-03-18 NOTE — TELEPHONE ENCOUNTER
Remote Transmission 3/18/2021:    1-SVT ?? AFL episode 3/16/2021@4:35am lasting 21 seconds.    Episode scanned into media.

## 2021-03-19 ENCOUNTER — TELEPHONE (OUTPATIENT)
Dept: CARDIOLOGY | Facility: MEDICAL CENTER | Age: 66
End: 2021-03-19

## 2021-03-19 NOTE — TELEPHONE ENCOUNTER
SS    Pts wife Gaye called stating Pt was having vision issues and Pts eye  recommended Pt start taking an aspirin along with his eliquis and she wanted to check with TT if that was alright.  Verified with Gaye that they are able to log into [employer] and will check there for a response.    Thank you

## 2021-03-22 ENCOUNTER — TELEPHONE (OUTPATIENT)
Dept: CARDIOLOGY | Facility: MEDICAL CENTER | Age: 66
End: 2021-03-22

## 2021-03-22 NOTE — TELEPHONE ENCOUNTER
Remote Transmission 3/20/2021:     1-SVT ?? AFL episode 3/19/2021@8:31am lasting 32 seconds.     Episode scanned into media.

## 2021-03-22 NOTE — TELEPHONE ENCOUNTER
Pt wife, Gaye called to F/V on previous call from Friday 3/19. Gaye is requesting a call back to further discuss at [phone].    Thank you

## 2021-03-25 NOTE — TELEPHONE ENCOUNTER
Babak Stacy M.D.  You 11 minutes ago (2:07 PM)     yes    Message text    You  Babak Stacy M.D. 3 days ago     To SS - pt ok to take baby aspirin with OAC???      LVM for wife. Advised would send mychart regarding.

## 2021-04-01 ENCOUNTER — OFFICE VISIT (OUTPATIENT)
Dept: CARDIOLOGY | Facility: MEDICAL CENTER | Age: 66
End: 2021-04-01
Payer: MEDICARE

## 2021-04-01 VITALS
DIASTOLIC BLOOD PRESSURE: 72 MMHG | OXYGEN SATURATION: 94 % | BODY MASS INDEX: 35.25 KG/M2 | HEIGHT: 73 IN | HEART RATE: 72 BPM | WEIGHT: 266 LBS | SYSTOLIC BLOOD PRESSURE: 122 MMHG

## 2021-04-01 DIAGNOSIS — I63.9 CEREBROVASCULAR ACCIDENT (CVA), UNSPECIFIED MECHANISM (HCC): ICD-10-CM

## 2021-04-01 DIAGNOSIS — G47.33 OSA (OBSTRUCTIVE SLEEP APNEA): ICD-10-CM

## 2021-04-01 DIAGNOSIS — I48.0 PAROXYSMAL A-FIB (HCC): ICD-10-CM

## 2021-04-01 DIAGNOSIS — Z79.01 CHRONIC ANTICOAGULATION: ICD-10-CM

## 2021-04-01 DIAGNOSIS — Z45.09 ENCOUNTER FOR LOOP RECORDER CHECK: ICD-10-CM

## 2021-04-01 DIAGNOSIS — I10 PRIMARY HYPERTENSION: ICD-10-CM

## 2021-04-01 PROCEDURE — 99214 OFFICE O/P EST MOD 30 MIN: CPT | Performed by: NURSE PRACTITIONER

## 2021-04-01 RX ORDER — OXYCODONE AND ACETAMINOPHEN 10; 325 MG/1; MG/1
TABLET ORAL
COMMUNITY
Start: 2021-03-15 | End: 2021-05-17

## 2021-04-01 RX ORDER — DOCUSATE SODIUM 100 MG
100 CAPSULE ORAL 2 TIMES DAILY
Status: ON HOLD | COMMUNITY
Start: 2021-03-29 | End: 2023-05-26

## 2021-04-01 RX ORDER — AMOXICILLIN 500 MG/1
2000 CAPSULE ORAL PRN
COMMUNITY

## 2021-04-01 RX ORDER — ARIPIPRAZOLE 10 MG/1
10 TABLET ORAL EVERY EVENING
COMMUNITY
Start: 2021-03-29

## 2021-04-01 ASSESSMENT — ENCOUNTER SYMPTOMS
STRIDOR: 0
SHORTNESS OF BREATH: 0
TINGLING: 0
SENSORY CHANGE: 0
FOCAL WEAKNESS: 0
PND: 0
COUGH: 0
HEMOPTYSIS: 0
TREMORS: 0
PALPITATIONS: 0
WEIGHT LOSS: 0
ORTHOPNEA: 0
SPUTUM PRODUCTION: 0
HEADACHES: 0
SPEECH CHANGE: 0
LOSS OF CONSCIOUSNESS: 0
CHILLS: 0
HEARTBURN: 0
SORE THROAT: 0
NAUSEA: 0
FEVER: 0
WHEEZING: 0
VOMITING: 0
DIZZINESS: 0
DIARRHEA: 0

## 2021-04-01 NOTE — PROGRESS NOTES
Cardiology/Electrophysiology Follow-up Note      Subjective:   Chief Complaint:   Chief Complaint   Patient presents with   • Atrial Fibrillation     Paroxysmal        Jeremi Ingram is a 65 y.o. male who presents today for follow up paroxysmal CVA, S/P ILR, subsequently low burden of AF seen on ILR.  Started on Eliquis back in 2019.     HE is followed by Dr. Stacy.  Past medical history also significant for CELIA, currently not treated with CPAP, HTN, hypothyroidism, T2 DM.     Today in follow up he sates that he has been doing well from a cardiac rhythm standpoint without evidence of significnat arrhtyhmia.  He states that he has been experiencing intermittent epsidoes of amurosis of his left eye.  States has seen opthamology and no occular source identieid, was recommneded to consider starting on Aspitrin 81 mg PO daily which he has.  States since then, he has had some continued intermittent epsidoes, lasting approximately 5 minutes in length and then with resoluation of symtpoms    He currently denies chest pain, dizziness, palpitations, pre syncope or syncope, dyspnea, PND, orthopnea, or lower extremity edema.        Patient endorses medication compliance        Past Medical History:   Diagnosis Date   • Atrial fibrillation (HCC)    • Bipolar 1 disorder (HCC)    • HTN (hypertension)    • Hypothyroidism    • CELIA (obstructive sleep apnea)    • Stroke (HCC)      Past Surgical History:   Procedure Laterality Date   • PB POLYSOMNOGRAPHY W/CPAP  4/6/2017        • WRIST FUSION  1997   • SHOULDER SURGERY  2013, 2013, 2018     Family History   Problem Relation Age of Onset   • Stroke Mother      Social History     Socioeconomic History   • Marital status:      Spouse name: Not on file   • Number of children: Not on file   • Years of education: Not on file   • Highest education level: Not on file   Occupational History   • Not on file   Tobacco Use   • Smoking status: Former Smoker     Packs/day: 0.00     Types:  Cigarettes     Quit date: 1999     Years since quittin.6   • Smokeless tobacco: Never Used   Substance and Sexual Activity   • Alcohol use: Never   • Drug use: Yes     Types: Marijuana     Comment: daily   • Sexual activity: Not on file   Other Topics Concern   • Not on file   Social History Narrative   • Not on file     Social Determinants of Health     Financial Resource Strain:    • Difficulty of Paying Living Expenses:    Food Insecurity:    • Worried About Running Out of Food in the Last Year:    • Ran Out of Food in the Last Year:    Transportation Needs:    • Lack of Transportation (Medical):    • Lack of Transportation (Non-Medical):    Physical Activity:    • Days of Exercise per Week:    • Minutes of Exercise per Session:    Stress:    • Feeling of Stress :    Social Connections:    • Frequency of Communication with Friends and Family:    • Frequency of Social Gatherings with Friends and Family:    • Attends [employer] Services:    • Active Member of Clubs or Organizations:    • Attends Club or Organization Meetings:    • Marital Status:    Intimate Partner Violence:    • Fear of Current or Ex-Partner:    • Emotionally Abused:    • Physically Abused:    • Sexually Abused:      Allergies   Allergen Reactions   • Gabapentin      Other reaction(s): Other (See Comments)  Leg pain and weakness  Other reaction(s): Leg pain and weakness, Other (See Comments)  Leg pain and weakness  Other reaction(s): Other (See Comments)  Leg pain and weakness     • Lithium      Visual disturbance  Other reaction(s): Coma, Other, Other (See Comments)  Built up in system and created high lvl's in lab results  TOXICITY  Visual disturbance  TOXICITY     • Morphine Unspecified     Hallucinations  Other reaction(s): Other (See Comments)  Hallucinations  Hallucinations   • Morphine Sulfate      Other reaction(s): Hallucinations       Current Outpatient Medications   Medication Sig Dispense Refill   • amoxicillin (AMOXIL) 500  MG Cap Take 500 mg by mouth 3 times a day.     • aspirin EC (ECOTRIN) 81 MG Tablet Delayed Response Take 1 tablet by mouth every day. 30 tablet    • ELIQUIS 5 MG Tab TAKE 1 TABLET TWICE A  Tab 1   • nitroglycerin (NITROSTAT) 0.4 MG SL Tab DISSOLVE 1 TABLET UNDER THE TONGUE EVERY 5 MINUTES AS NEEDED FOR CHEST PAIN. DO NOT EXCEED A TOTAL OF 3 DOSES IN 15 MINUTES. CALL 911 IF NO     • metoprolol SR (TOPROL XL) 25 MG TABLET SR 24 HR Take 1 Tab by mouth every day. 90 Tab 3   • atorvastatin (LIPITOR) 80 MG tablet Take 1 Tab by mouth every evening. 90 Tab 3   • tramadol (ULTRAM) 50 MG Tab Take 50 mg by mouth every 8 hours as needed for Severe Pain.     • venlafaxine (EFFEXOR-XR) 150 MG extended-release capsule Take 150 mg by mouth every day. 150 mg in the morning at 50 mg in the evening.     • omeprazole (PRILOSEC) 20 MG delayed-release capsule Take 20 mg by mouth every day.     • benazepril (LOTENSIN) 5 MG Tab Take 5 mg by mouth every day.     • levothyroxine (SYNTHROID) 200 MCG Tab Take 100 mcg by mouth every morning on an empty stomach.     • alfuzosin (UROXATRAL) 10 MG SR tablet Take 10 mg by mouth every day.     • Cholecalciferol (VITAMIN D3) 5000 units Cap Take 1 Cap by mouth every day.     • ARIPiprazole (ABILIFY) 10 MG Tab Take  by mouth every evening.     • STOOL SOFTENER 100 MG Cap Take 100 mg by mouth.     • oxyCODONE-acetaminophen (PERCOCET-10)  MG Tab Take 1-2 Tablets by mouth every 6 hours as needed for Pain, Severe. Max Daily Amount 8 Tablets     • doxycycline (VIBRAMYCIN) 100 MG Cap Take 100 mg by mouth.     • therapeutic multivitamin-minerals (THERAGRAN-M) Tab Take 1 Tab by mouth every day.     • divalproex (DEPAKOTE) 500 MG Tablet Delayed Response Take 1,000 mg by mouth 2 Times a Day.     • venlafaxine (EFFEXOR) 50 MG tablet Take 50 mg by mouth every bedtime.       No current facility-administered medications for this visit.       Review of Systems   Constitutional: Negative for chills,  "fever, malaise/fatigue and weight loss.   HENT: Negative for congestion, sore throat and tinnitus.    Eyes:        C/O intermittent amaurosis    Respiratory: Negative for cough, hemoptysis, sputum production, shortness of breath, wheezing and stridor.    Cardiovascular: Negative for chest pain, palpitations, orthopnea, leg swelling and PND.   Gastrointestinal: Negative for diarrhea, heartburn, nausea and vomiting.   Skin: Negative for rash.   Neurological: Negative for dizziness, tingling, tremors, sensory change, speech change, focal weakness, loss of consciousness and headaches.     All others systems reviewed and negative.     Objective:     /72 (BP Location: Left arm, Patient Position: Sitting, BP Cuff Size: Large adult)   Pulse 72   Ht 1.854 m (6' 1\")   Wt 121 kg (266 lb)   SpO2 94%  Body mass index is 35.09 kg/m².    Weight/BMI: Body mass index is 35.09 kg/m².  Wt Readings from Last 4 Encounters:   04/01/21 121 kg (266 lb)   12/03/20 120 kg (264 lb 8.8 oz)   11/02/20 116 kg (256 lb)   05/04/20 110 kg (243 lb)         Physical Exam   Constitutional: He is oriented to person, place, and time and well-developed, well-nourished, and in no distress.   HENT:   Head: Normocephalic and atraumatic.   Eyes: Pupils are equal, round, and reactive to light. Conjunctivae and EOM are normal.   Neck: No JVD present. No tracheal deviation present.   Cardiovascular: Normal rate, regular rhythm, normal heart sounds and intact distal pulses. Exam reveals no gallop and no friction rub.   No murmur heard.  Pulses:       Radial pulses are 2+ on the right side and 2+ on the left side.        Dorsalis pedis pulses are 2+ on the right side and 2+ on the left side.        Posterior tibial pulses are 2+ on the right side and 2+ on the left side.   Pulmonary/Chest: Effort normal and breath sounds normal. No respiratory distress. He has no wheezes. He has no rales. He exhibits no tenderness.   ILR left mid chest- no erythema  "   Abdominal: Soft. Bowel sounds are normal.   Musculoskeletal:         General: No edema. Normal range of motion.      Cervical back: Normal range of motion and neck supple.   Neurological: He is alert and oriented to person, place, and time.   Skin: Skin is warm and dry.   Psychiatric: Mood, memory, affect and judgment normal.         Cardiac Imaging and Procedures Review:    Echo (8/26/19):   CONCLUSIONS  No prior study is available for comparison.   Normal left ventricular systolic function.  Left ventricular ejection fraction is visually estimated to be 65%.  No evidence of right to left shunt by agitated saline challenge.  No significant valve abnormalities.   Estimated right ventricular systolic pressure is 20 mmHg.  A definite cardiac source for a systemic thromboembolic event is not   identified, failure to do so does not exclude its presence. If   clinically indicated, a transesophageal study would be useful.      Labs (personally reviewed and notable for):        Assessment:     1. Cerebrovascular accident (CVA), unspecified mechanism (HCC)  LIPID PANEL    Comp Metabolic Panel    US-CAROTID DOPPLER BILAT   2. Paroxysmal A-fib (HCC)     3. Encounter for loop recorder check     4. Primary hypertension     5. Chronic anticoagulation     6. CELIA (obstructive sleep apnea)         Medical Decision Making:  Today's Assessment / Status / Plan:   1. Previous CVA:  - 2019 with small cortical infarcts in the right frontal, parietal and temporal lobes on MRI.   - He is S/P ILR with subsequent identification of AF and started on anticoagulation with Eliquis.   - C/O intermittent periods of amaurosis now, has seen opthalmology and no detected occular etiology.  - Hx of >50% carotid artery disease, recheck carotid doppler given symptoms.  Discussed to follow up [facility] neurology as well.  Check lipids, LDL goal <70.   - Agree with addition of Aspirin 81 mg daily.    - May need to consider change of anticoagulant- will discuss  with Dr. Stacy.  - On high intensity statin: lipitor 80.     2. Paroxysmal Afib:  - ILR interrogated today, overall estimated AF burden is <1%, 74 events, total logged time 4 hrs: many of those logged episodes C/W sinus.  - He is anticoagulated with Eliquis.   - Possible AT/AFL epsidoes      3. ILR check:  - one recorded pause that is innacurrate: undersensing of device.     4. HTN:  - Blood pressure is controled today.    5. CELIA:  - Currently not treated but trying to working with pulm to use CPAP.       Plan reviewed in detail with the patient and he verbalizes understanding and is in agreement.   RTC in 3 months, sooner if clinical condition changes  Collaborating MD/ADD: Quyen    Please note that this dictation was created using voice recognition software. I have made every reasonable attempt to correct obvious errors, but I expect that there are errors of grammar and possibly content that I did not discover before finalizing the note.    CHANTELLE Prieto.

## 2021-04-07 ENCOUNTER — TELEPHONE (OUTPATIENT)
Dept: CARDIOLOGY | Facility: MEDICAL CENTER | Age: 66
End: 2021-04-07

## 2021-04-07 DIAGNOSIS — G45.3 AMAUROSIS FUGAX: ICD-10-CM

## 2021-04-07 NOTE — TELEPHONE ENCOUNTER
Voalte message from Benita BEEBE to order brain MRI w/o contrast due to hx CVA, amaurosis. Let patient know she discussed it with Dr. Stacy and he recommends an MRI for evaluation, and Benita has reached out to patient's neurologist.     LVM with patient to notify of order. Sent MyChart message

## 2021-04-09 ENCOUNTER — NON-PROVIDER VISIT (OUTPATIENT)
Dept: CARDIOLOGY | Facility: MEDICAL CENTER | Age: 66
End: 2021-04-09
Payer: MEDICARE

## 2021-04-09 DIAGNOSIS — Z95.818 STATUS POST PLACEMENT OF IMPLANTABLE LOOP RECORDER: ICD-10-CM

## 2021-04-09 DIAGNOSIS — I63.9 CRYPTOGENIC STROKE (HCC): ICD-10-CM

## 2021-04-09 PROCEDURE — 93298 REM INTERROG DEV EVAL SCRMS: CPT | Performed by: INTERNAL MEDICINE

## 2021-04-16 ENCOUNTER — TELEPHONE (OUTPATIENT)
Dept: CARDIOLOGY | Facility: MEDICAL CENTER | Age: 66
End: 2021-04-16

## 2021-04-16 NOTE — TELEPHONE ENCOUNTER
Remote transmission 4-:  1-? SVT vs AFL episode on 4- ~30 seconds, rate ~150 bpm  Presenting rhythm: SR 74 bpm  To be scanned into media

## 2021-05-03 ENCOUNTER — HOSPITAL ENCOUNTER (OUTPATIENT)
Dept: RADIOLOGY | Facility: MEDICAL CENTER | Age: 66
End: 2021-05-03
Attending: NURSE PRACTITIONER
Payer: MEDICARE

## 2021-05-03 DIAGNOSIS — G45.3 AMAUROSIS FUGAX: ICD-10-CM

## 2021-05-03 PROCEDURE — 70551 MRI BRAIN STEM W/O DYE: CPT | Mod: MG

## 2021-05-04 ENCOUNTER — TELEPHONE (OUTPATIENT)
Dept: CARDIOLOGY | Facility: MEDICAL CENTER | Age: 66
End: 2021-05-04

## 2021-05-04 DIAGNOSIS — I63.9 CEREBROVASCULAR ACCIDENT (CVA), UNSPECIFIED MECHANISM (HCC): ICD-10-CM

## 2021-05-12 ENCOUNTER — NON-PROVIDER VISIT (OUTPATIENT)
Dept: CARDIOLOGY | Facility: MEDICAL CENTER | Age: 66
End: 2021-05-12
Payer: MEDICARE

## 2021-05-12 DIAGNOSIS — Z95.818 STATUS POST PLACEMENT OF IMPLANTABLE LOOP RECORDER: ICD-10-CM

## 2021-05-12 DIAGNOSIS — I48.0 PAROXYSMAL A-FIB (HCC): ICD-10-CM

## 2021-05-12 PROCEDURE — 93298 REM INTERROG DEV EVAL SCRMS: CPT | Performed by: INTERNAL MEDICINE

## 2021-05-17 ENCOUNTER — OFFICE VISIT (OUTPATIENT)
Dept: CARDIOLOGY | Facility: MEDICAL CENTER | Age: 66
End: 2021-05-17
Payer: MEDICARE

## 2021-05-17 ENCOUNTER — TELEPHONE (OUTPATIENT)
Dept: CARDIOLOGY | Facility: MEDICAL CENTER | Age: 66
End: 2021-05-17

## 2021-05-17 VITALS
WEIGHT: 275 LBS | HEIGHT: 73 IN | HEART RATE: 63 BPM | BODY MASS INDEX: 36.45 KG/M2 | SYSTOLIC BLOOD PRESSURE: 110 MMHG | OXYGEN SATURATION: 95 % | DIASTOLIC BLOOD PRESSURE: 68 MMHG

## 2021-05-17 DIAGNOSIS — Z92.82 S/P ADMN TPA IN DIFF FAC W/N LAST 24 HR BEF ADM TO CRNT FAC: ICD-10-CM

## 2021-05-17 DIAGNOSIS — Z45.09 ENCOUNTER FOR LOOP RECORDER CHECK: ICD-10-CM

## 2021-05-17 DIAGNOSIS — I48.0 PAROXYSMAL A-FIB (HCC): ICD-10-CM

## 2021-05-17 PROCEDURE — 93000 ELECTROCARDIOGRAM COMPLETE: CPT | Mod: 59 | Performed by: INTERNAL MEDICINE

## 2021-05-17 PROCEDURE — 93291 INTERROG DEV EVAL SCRMS IP: CPT | Performed by: INTERNAL MEDICINE

## 2021-05-17 PROCEDURE — 99214 OFFICE O/P EST MOD 30 MIN: CPT | Performed by: INTERNAL MEDICINE

## 2021-05-17 RX ORDER — CLOPIDOGREL BISULFATE 75 MG/1
75 TABLET ORAL DAILY
Qty: 30 TABLET | Refills: 5 | Status: SHIPPED | OUTPATIENT
Start: 2021-05-17 | End: 2021-05-18 | Stop reason: SDUPTHER

## 2021-05-17 NOTE — TELEPHONE ENCOUNTER
GI clearance received, reviewed with SS. Pt may proceed but is not cleared to hold OAC. Form filled out and sent to [facility]

## 2021-05-17 NOTE — TELEPHONE ENCOUNTER
Patient scheduled for HOLLY w/anesthesia on 6-3-21 with Dr. Salgado. Patient has been instructed to check in at 7:00am for 9:00 case time. Message sent to alissa SANDHU to Dr. Salgado.

## 2021-05-17 NOTE — TELEPHONE ENCOUNTER
----- Message from Babak Stacy M.D. sent at 5/17/2021 11:08 AM PDT -----  Let's schedule HOLLY for him with whomever available.    Babak

## 2021-05-17 NOTE — PROGRESS NOTES
Arrhythmia Clinic Note (Established patient)    DOS: 2021    Chief complaint/Reason for consult: F/u AF/AFL and stroke    Interval History:  Patient is a 65 yo M. History of HTN and AF/AFL found on ILR device after prior CVA event. He was placed on OAC with Eliquis and continues with ASA 81. Intermittently he has had worsening total R sided visual field deficits (normally no peripheral vision on R side). Repeat MR was done showing R side occipital infarct (subacute/chronic) that was new since the 2019 MR. He says visits to the eye doctor have suggested new embolic infarct to the eye itself?    ROS (+ highlighted in red):  General--Negative for fatigue, weight loss or weight gain  Cardiovascular--Negative for CP, orthopnea, PND    Past Medical History:   Diagnosis Date   • Atrial fibrillation (HCC)    • Bipolar 1 disorder (HCC)    • HTN (hypertension)    • Hypothyroidism    • CELIA (obstructive sleep apnea)    • Stroke (HCC)        Past Surgical History:   Procedure Laterality Date   • PB POLYSOMNOGRAPHY W/CPAP  2017        • WRIST FUSION     • SHOULDER SURGERY  , ,        Social History     Socioeconomic History   • Marital status:      Spouse name: Not on file   • Number of children: Not on file   • Years of education: Not on file   • Highest education level: Not on file   Occupational History   • Not on file   Tobacco Use   • Smoking status: Former Smoker     Packs/day: 0.00     Types: Cigarettes     Quit date: 1999     Years since quittin.7   • Smokeless tobacco: Never Used   Vaping Use   • Vaping Use: Never assessed   Substance and Sexual Activity   • Alcohol use: Never   • Drug use: Yes     Types: Marijuana     Comment: daily   • Sexual activity: Not on file   Other Topics Concern   • Not on file   Social History Narrative   • Not on file     Social Determinants of Health     Financial Resource Strain:    • Difficulty of Paying Living Expenses:    Food Insecurity:    •  Worried About Running Out of Food in the Last Year:    • Ran Out of Food in the Last Year:    Transportation Needs:    • Lack of Transportation (Medical):    • Lack of Transportation (Non-Medical):    Physical Activity:    • Days of Exercise per Week:    • Minutes of Exercise per Session:    Stress:    • Feeling of Stress :    Social Connections:    • Frequency of Communication with Friends and Family:    • Frequency of Social Gatherings with Friends and Family:    • Attends [employer] Services:    • Active Member of Clubs or Organizations:    • Attends Club or Organization Meetings:    • Marital Status:    Intimate Partner Violence:    • Fear of Current or Ex-Partner:    • Emotionally Abused:    • Physically Abused:    • Sexually Abused:        Family History   Problem Relation Age of Onset   • Stroke Mother        Allergies   Allergen Reactions   • Gabapentin      Other reaction(s): Other (See Comments)  Leg pain and weakness  Other reaction(s): Leg pain and weakness, Other (See Comments)  Leg pain and weakness  Other reaction(s): Other (See Comments)  Leg pain and weakness     • Lithium      Visual disturbance  Other reaction(s): Coma, Other, Other (See Comments)  Built up in system and created high lvl's in lab results  TOXICITY  Visual disturbance  TOXICITY     • Morphine Unspecified     Hallucinations  Other reaction(s): Other (See Comments)  Hallucinations  Hallucinations   • Morphine Sulfate      Other reaction(s): Hallucinations       Current Outpatient Medications   Medication Sig Dispense Refill   • clopidogrel (PLAVIX) 75 MG Tab Take 1 tablet by mouth every day. 30 tablet 5   • amoxicillin (AMOXIL) 500 MG Cap Take 500 mg by mouth 3 times a day.     • ARIPiprazole (ABILIFY) 10 MG Tab Take  by mouth every evening.     • STOOL SOFTENER 100 MG Cap Take 100 mg by mouth.     • ELIQUIS 5 MG Tab TAKE 1 TABLET TWICE A  Tab 1   • nitroglycerin (NITROSTAT) 0.4 MG SL Tab DISSOLVE 1 TABLET UNDER THE TONGUE  "EVERY 5 MINUTES AS NEEDED FOR CHEST PAIN. DO NOT EXCEED A TOTAL OF 3 DOSES IN 15 MINUTES. CALL 911 IF NO     • therapeutic multivitamin-minerals (THERAGRAN-M) Tab Take 1 Tab by mouth every day.     • metoprolol SR (TOPROL XL) 25 MG TABLET SR 24 HR Take 1 Tab by mouth every day. 90 Tab 3   • atorvastatin (LIPITOR) 80 MG tablet Take 1 Tab by mouth every evening. 90 Tab 3   • tramadol (ULTRAM) 50 MG Tab Take 50 mg by mouth every 8 hours as needed for Severe Pain.     • venlafaxine (EFFEXOR-XR) 150 MG extended-release capsule Take 150 mg by mouth every day. 150 mg in the morning at 50 mg in the evening.     • omeprazole (PRILOSEC) 20 MG delayed-release capsule Take 20 mg by mouth every day.     • benazepril (LOTENSIN) 5 MG Tab Take 5 mg by mouth every day.     • levothyroxine (SYNTHROID) 200 MCG Tab Take 150 mcg by mouth every morning on an empty stomach.     • alfuzosin (UROXATRAL) 10 MG SR tablet Take 10 mg by mouth every day.     • Cholecalciferol (VITAMIN D3) 5000 units Cap Take 1 Cap by mouth every day.     • oxyCODONE-acetaminophen (PERCOCET-10)  MG Tab Take 1-2 Tablets by mouth every 6 hours as needed for Pain, Severe. Max Daily Amount 8 Tablets (Patient not taking: Reported on 5/17/2021)     • doxycycline (VIBRAMYCIN) 100 MG Cap Take 100 mg by mouth. (Patient not taking: Reported on 5/17/2021)     • divalproex (DEPAKOTE) 500 MG Tablet Delayed Response Take 1,000 mg by mouth 2 Times a Day. (Patient not taking: Reported on 5/17/2021)     • venlafaxine (EFFEXOR) 50 MG tablet Take 50 mg by mouth every bedtime. (Patient not taking: Reported on 5/17/2021)       No current facility-administered medications for this visit.       Physical Exam:  Vitals:    05/17/21 1006   BP: 110/68   BP Location: Left arm   Patient Position: Sitting   BP Cuff Size: Adult   Pulse: 63   SpO2: 95%   Weight: 125 kg (275 lb)   Height: 1.854 m (6' 1\")     General appearance: NAD, conversant  HEENT: PERRL, neck is supple with " FROM  Lungs: Clear to auscultation, normal respiratory effort  CV: RRR, no murmurs/rubs/gallops, no JVD  Abdomen: Soft, non-tender with normal bowel sounds  Extremities: No peripheral edema, no clubbing or cyanosis  Skin: No rash, lesions, or ulcers  Psych: Alert and oriented to person, place and time    Data:  Labs reviewed    Prior echo/stress reviewed:  Normal EF    ILR reviewed, intermittent AFL    Impression/Plan:  1. Paroxysmal A-fib (HCC)  EKG    EC-HOLLY W/O CONT   2. S/P admn tPA in diff fac w/n last 24 hr bef adm to crnt fac     3. Encounter for loop recorder check       -CELIA being treated which I think will help lower AF/AFL burden  -I am not certain he is having new embolic phenomenon on OAC  -I will check a HOLLY looking at THOMAS appendage and velocities (though if he is not in AF/AFL may not show any stasis/smoke)  -I will switch his ASA over to plavix    Babak Stacy MD

## 2021-05-18 DIAGNOSIS — I48.0 PAF (PAROXYSMAL ATRIAL FIBRILLATION) (HCC): ICD-10-CM

## 2021-05-21 RX ORDER — CLOPIDOGREL BISULFATE 75 MG/1
75 TABLET ORAL DAILY
Qty: 90 TABLET | Refills: 3 | Status: SHIPPED | OUTPATIENT
Start: 2021-05-21 | End: 2022-04-07

## 2021-05-24 LAB — EKG IMPRESSION: NORMAL

## 2021-05-25 ENCOUNTER — TELEPHONE (OUTPATIENT)
Dept: NEUROLOGY | Facility: MEDICAL CENTER | Age: 66
End: 2021-05-25

## 2021-05-25 NOTE — TELEPHONE ENCOUNTER
An APRN  From [facility] cardiology called asking to speak to Dr Ceballos about the pt. Her direct line is [phone].

## 2021-05-28 ENCOUNTER — PRE-ADMISSION TESTING (OUTPATIENT)
Dept: ADMISSIONS | Facility: MEDICAL CENTER | Age: 66
End: 2021-05-28
Attending: INTERNAL MEDICINE
Payer: MEDICARE

## 2021-05-28 RX ORDER — MULTIVIT-MIN/IRON/FOLIC ACID/K 18-600-40
CAPSULE ORAL DAILY
COMMUNITY

## 2021-05-28 RX ORDER — LEVOTHYROXINE SODIUM 0.15 MG/1
150 TABLET ORAL
Status: ON HOLD | COMMUNITY
End: 2023-05-26

## 2021-05-28 NOTE — PREPROCEDURE INSTRUCTIONS
Pre admit:  Spoke to Otilia,  for [facility], in regards to what instructions to give patient for his procedure on 6/3/2021.  Otilia stated the patient needs to be NPO for 8 hours, may continue to take all medications and needs a . Informed the patient of these instructions and he verbalized an understanding.

## 2021-05-31 ENCOUNTER — PRE-ADMISSION TESTING (OUTPATIENT)
Dept: ADMISSIONS | Facility: MEDICAL CENTER | Age: 66
End: 2021-05-31
Attending: INTERNAL MEDICINE
Payer: MEDICARE

## 2021-05-31 DIAGNOSIS — Z01.812 PRE-OPERATIVE LABORATORY EXAMINATION: ICD-10-CM

## 2021-05-31 LAB — COVID ORDER STATUS COVID19: NORMAL

## 2021-05-31 PROCEDURE — C9803 HOPD COVID-19 SPEC COLLECT: HCPCS

## 2021-05-31 PROCEDURE — U0003 INFECTIOUS AGENT DETECTION BY NUCLEIC ACID (DNA OR RNA); SEVERE ACUTE RESPIRATORY SYNDROME CORONAVIRUS 2 (SARS-COV-2) (CORONAVIRUS DISEASE [COVID-19]), AMPLIFIED PROBE TECHNIQUE, MAKING USE OF HIGH THROUGHPUT TECHNOLOGIES AS DESCRIBED BY CMS-2020-01-R: HCPCS

## 2021-05-31 PROCEDURE — U0005 INFEC AGEN DETEC AMPLI PROBE: HCPCS

## 2021-06-01 LAB
SARS-COV-2 RNA RESP QL NAA+PROBE: NOTDETECTED
SPECIMEN SOURCE: NORMAL

## 2021-06-03 ENCOUNTER — APPOINTMENT (OUTPATIENT)
Dept: CARDIOLOGY | Facility: MEDICAL CENTER | Age: 66
End: 2021-06-03
Attending: INTERNAL MEDICINE
Payer: MEDICARE

## 2021-06-03 ENCOUNTER — HOSPITAL ENCOUNTER (OUTPATIENT)
Facility: MEDICAL CENTER | Age: 66
End: 2021-06-03
Attending: INTERNAL MEDICINE | Admitting: INTERNAL MEDICINE
Payer: MEDICARE

## 2021-06-03 ENCOUNTER — ANESTHESIA EVENT (OUTPATIENT)
Dept: CARDIOLOGY | Facility: MEDICAL CENTER | Age: 66
End: 2021-06-03
Payer: MEDICARE

## 2021-06-03 ENCOUNTER — ANESTHESIA (OUTPATIENT)
Dept: CARDIOLOGY | Facility: MEDICAL CENTER | Age: 66
End: 2021-06-03
Payer: MEDICARE

## 2021-06-03 VITALS
DIASTOLIC BLOOD PRESSURE: 69 MMHG | HEIGHT: 73 IN | WEIGHT: 272.49 LBS | BODY MASS INDEX: 36.11 KG/M2 | TEMPERATURE: 97.3 F | SYSTOLIC BLOOD PRESSURE: 104 MMHG | RESPIRATION RATE: 18 BRPM | HEART RATE: 59 BPM | OXYGEN SATURATION: 94 %

## 2021-06-03 DIAGNOSIS — I48.0 PAROXYSMAL A-FIB (HCC): ICD-10-CM

## 2021-06-03 LAB
ANION GAP SERPL CALC-SCNC: 10 MMOL/L (ref 7–16)
BUN SERPL-MCNC: 10 MG/DL (ref 8–22)
CALCIUM SERPL-MCNC: 9.2 MG/DL (ref 8.5–10.5)
CHLORIDE SERPL-SCNC: 104 MMOL/L (ref 96–112)
CO2 SERPL-SCNC: 24 MMOL/L (ref 20–33)
CREAT SERPL-MCNC: 0.83 MG/DL (ref 0.5–1.4)
ERYTHROCYTE [DISTWIDTH] IN BLOOD BY AUTOMATED COUNT: 46.8 FL (ref 35.9–50)
GLUCOSE SERPL-MCNC: 98 MG/DL (ref 65–99)
HCT VFR BLD AUTO: 44.6 % (ref 42–52)
HGB BLD-MCNC: 14.7 G/DL (ref 14–18)
MCH RBC QN AUTO: 31.2 PG (ref 27–33)
MCHC RBC AUTO-ENTMCNC: 33 G/DL (ref 33.7–35.3)
MCV RBC AUTO: 94.7 FL (ref 81.4–97.8)
PLATELET # BLD AUTO: 204 K/UL (ref 164–446)
PMV BLD AUTO: 10.5 FL (ref 9–12.9)
POTASSIUM SERPL-SCNC: 4.2 MMOL/L (ref 3.6–5.5)
RBC # BLD AUTO: 4.71 M/UL (ref 4.7–6.1)
SODIUM SERPL-SCNC: 138 MMOL/L (ref 135–145)
WBC # BLD AUTO: 6.5 K/UL (ref 4.8–10.8)

## 2021-06-03 PROCEDURE — 700111 HCHG RX REV CODE 636 W/ 250 OVERRIDE (IP): Performed by: ANESTHESIOLOGY

## 2021-06-03 PROCEDURE — 93312 ECHO TRANSESOPHAGEAL: CPT | Mod: 26 | Performed by: INTERNAL MEDICINE

## 2021-06-03 PROCEDURE — 93325 DOPPLER ECHO COLOR FLOW MAPG: CPT | Mod: 26 | Performed by: INTERNAL MEDICINE

## 2021-06-03 PROCEDURE — 700105 HCHG RX REV CODE 258: Performed by: INTERNAL MEDICINE

## 2021-06-03 PROCEDURE — 93325 DOPPLER ECHO COLOR FLOW MAPG: CPT

## 2021-06-03 PROCEDURE — 700101 HCHG RX REV CODE 250: Performed by: ANESTHESIOLOGY

## 2021-06-03 PROCEDURE — 160002 HCHG RECOVERY MINUTES (STAT)

## 2021-06-03 PROCEDURE — 80048 BASIC METABOLIC PNL TOTAL CA: CPT

## 2021-06-03 PROCEDURE — A9270 NON-COVERED ITEM OR SERVICE: HCPCS | Performed by: INTERNAL MEDICINE

## 2021-06-03 PROCEDURE — 85027 COMPLETE CBC AUTOMATED: CPT

## 2021-06-03 PROCEDURE — 700101 HCHG RX REV CODE 250: Performed by: INTERNAL MEDICINE

## 2021-06-03 RX ORDER — SODIUM CHLORIDE, SODIUM LACTATE, POTASSIUM CHLORIDE, CALCIUM CHLORIDE 600; 310; 30; 20 MG/100ML; MG/100ML; MG/100ML; MG/100ML
INJECTION, SOLUTION INTRAVENOUS CONTINUOUS
Status: DISCONTINUED | OUTPATIENT
Start: 2021-06-03 | End: 2021-06-03 | Stop reason: HOSPADM

## 2021-06-03 RX ADMIN — POVIDONE IODINE 15 ML: 100 SOLUTION TOPICAL at 09:19

## 2021-06-03 RX ADMIN — PROPOFOL 150 MG: 10 INJECTION, EMULSION INTRAVENOUS at 10:04

## 2021-06-03 RX ADMIN — SODIUM CHLORIDE, POTASSIUM CHLORIDE, SODIUM LACTATE AND CALCIUM CHLORIDE: 600; 310; 30; 20 INJECTION, SOLUTION INTRAVENOUS at 08:42

## 2021-06-03 RX ADMIN — GLYCOPYRROLATE 0.2 MG: 0.2 INJECTION INTRAMUSCULAR; INTRAVENOUS at 10:04

## 2021-06-03 ASSESSMENT — PAIN SCALES - GENERAL: PAIN_LEVEL: 0

## 2021-06-03 NOTE — ANESTHESIA TIME REPORT
Anesthesia Start and Stop Event Times     Date Time Event    6/3/2021 0957 Ready for Procedure     1002 Anesthesia Start     1027 Anesthesia Stop        Responsible Staff  06/03/21    Name Role Begin End    Jason Palmer M.D. Anesth 1002 1027        Preop Diagnosis (Free Text):  Pre-op Diagnosis             Preop Diagnosis (Codes):    Post op Diagnosis  Stroke (HCC)      Premium Reason  Non-Premium    Comments:

## 2021-06-03 NOTE — ANESTHESIA POSTPROCEDURE EVALUATION
Patient: Jeremi Ingram    Procedure Summary     Date: 06/03/21 Room / Location: [facility] IMAGING - ECHOCARDIOLOGY [facility]    Anesthesia Start: 1002 Anesthesia Stop: 1027    Procedure: EC-HOLLY W/O CONT Diagnosis: Paroxysmal A-fib (HCC)    Scheduled Providers: Sofiya Salgado M.D.; Jason Palmer M.D. Responsible Provider: Jason Palmer M.D.    Anesthesia Type: general ASA Status: 2          Final Anesthesia Type: general  Last vitals  BP   Blood Pressure : 128/78    Temp   36.5 °C (97.7 °F)    Pulse   60   Resp   18    SpO2   95 %      Anesthesia Post Evaluation    Patient location during evaluation: PACU  Patient participation: complete - patient participated  Level of consciousness: awake and alert  Pain score: 0    Airway patency: patent  Anesthetic complications: no  Cardiovascular status: hemodynamically stable  Respiratory status: acceptable  Hydration status: euvolemic    PONV: none          No complications documented.     Nurse Pain Score: 0 (NPRS)

## 2021-06-03 NOTE — ANESTHESIA PREPROCEDURE EVALUATION
Relevant Problems   ANESTHESIA   (positive) CELIA (obstructive sleep apnea)      NEURO   (positive) Stroke (HCC)      CARDIAC   (positive) Paroxysmal A-fib (HCC)   (positive) Primary hypertension      ENDO   (positive) Hypothyroidism   (positive) Type 2 diabetes mellitus, without long-term current use of insulin (HCC)       Physical Exam    Airway   Mallampati: II  TM distance: >3 FB  Neck ROM: full       Cardiovascular - normal exam  Rhythm: regular  Rate: normal  (-) murmur     Dental - normal exam           Pulmonary - normal exam  Breath sounds clear to auscultation     Abdominal    Neurological - normal exam                 Anesthesia Plan    ASA 2       Plan - general               Induction: intravenous    Postoperative Plan: Postoperative administration of opioids is intended.    Pertinent diagnostic labs and testing reviewed    Informed Consent:    Anesthetic plan and risks discussed with patient.    Use of blood products discussed with: patient whom consented to blood products.

## 2021-06-03 NOTE — OR NURSING
1033 Patient arrived from Endo post HOLLY. Patient awake, denies pain, denies nausea. Vital signs stable.   22713 Patient tolerating po intake.   1111 Criteria met to discharge patient home.   1115 Discharge instructions given to patient, patient verbalize understanding of the orders. Reviewed activity, worsening symptoms/management, follow up, medications.   1123 Patient escorted via w/c to responsible adult. Patient with all his belongings.

## 2021-06-03 NOTE — DISCHARGE INSTRUCTIONS
ACTIVITY: Rest and take it easy for the first 24 hours.  A responsible adult is recommended to remain with you during that time.  It is normal to feel sleepy.  We encourage you to not do anything that requires balance, judgment or coordination.    MILD FLU-LIKE SYMPTOMS ARE NORMAL. YOU MAY EXPERIENCE GENERALIZED MUSCLE ACHES, THROAT IRRITATION, HEADACHE AND/OR SOME NAUSEA.    FOR 24 HOURS DO NOT:  Drive, operate machinery or run household appliances.  Drink beer or alcoholic beverages.   Make important decisions or sign legal documents.    SPECIAL INSTRUCTIONS: Follow up with your primary care physician call find out when to follow up.    DIET: To avoid nausea, slowly advance diet as tolerated, avoiding spicy or greasy foods for the first day.  Add more substantial food to your diet according to your physician's instructions.  Babies can be fed formula or breast milk as soon as they are hungry.  INCREASE FLUIDS AND FIBER TO AVOID CONSTIPATION.    FOLLOW-UP APPOINTMENT:  A follow-up appointment should be arranged with your doctor in [phone]; call to schedule.    You should CALL YOUR PHYSICIAN if you develop:  Fever greater than 101 degrees F.  Pain not relieved by medication, or persistent nausea or vomiting.  Excessive bleeding (blood soaking through dressing) or unexpected drainage from the wound.  Extreme redness or swelling around the incision site, drainage of pus or foul smelling drainage.  Inability to urinate or empty your bladder within 8 hours.  Problems with breathing or chest pain.    You should call [phone] if you develop problems with breathing or chest pain.  If you are unable to contact your doctor or surgical center, you should go to the nearest emergency room or urgent care center.  Physician's telephone #: [phone]    If any questions arise, call your doctor.  If your doctor is not available, please feel free to call the Surgical Center at [phone]. The Contact Center is open Monday through  Friday 7AM to 5PM and may speak to a nurse at [phone], or toll free at [phone].     A registered nurse may call you a few days after your surgery to see how you are doing after your procedure.    MEDICATIONS: Resume taking daily medication.  Take prescribed pain medication with food.  If no medication is prescribed, you may take non-aspirin pain medication if needed.  PAIN MEDICATION CAN BE VERY CONSTIPATING.  Take a stool softener or laxative such as senokot, pericolace, or milk of magnesia if needed.  Transesophageal Echocardiogram    Transesophageal echocardiogram (HOLLY) is a test that uses sound waves (echocardiogram) to produce very clear, detailed images of the heart. HOLLY is done by passing a flexible tube down the esophagus. The heart is located in front of the esophagus.  HOLLY may be done:  · To check how well your heart valves are working.  · To check for any abnormal growth or tumor  · To look for blood clots  · To check for infection of the lining of the heart (endocarditis).  · To evaluate the dividing wall (septum) of the heart and check for a hole that did not close after birth (patent foramen ovale or atrial septal defect).  · To help diagnose a tear in the wall of the blood vessels (aortic dissection).  · To look at the heart shape, size, and function. Any changes can be associated with certain conditions, including heart failure, aneurysm, and coronary heart disease, CAD.  · During cardiac valve surgery. This allows the surgeon to assess the valve repair before closing the chest.  · During a variety of other cardiac procedures to guide positioning of catheters.  · To monitor your heart's response to IV fluids or medicine.  HOLLY is usually not a painful procedure. You may feel the probe press against the back of the throat. The probe does not enter the trachea and does not affect your breathing.  Tell a health care provider about:  · Any allergies you have.  · All medicines you are taking,  including vitamins, herbs, eye drops, creams, and over-the-counter medicines.  · Any problems you or family members have had with anesthetic medicines.  · Any blood disorders you have.  · Any surgeries you have had.  · Any medical conditions you have.  · Any swallowing difficulties.  · Whether you have or have had a blockage of the esophagus (esophageal obstruction).  · Whether you are pregnant or may be pregnant.  What are the risks?  Generally, this is a safe procedure. However, problems may occur, including:  · Damage to other structures or organs.  · A tear of the esophagus (esophageal rupture).  · Irregular heart beat (arrhythmia).  · Hoarse voice or difficulty swallowing.  · Bleeding (hemorrhage).  What happens before the procedure?  Staying hydrated  Follow instructions from your health care provider about hydration, which may include:  · Up to 3 hours before the procedure - you may continue to drink clear liquids, such as water, clear fruit juice, black coffee, and plain tea.  Eating and drinking  Follow instructions from your health care provider about eating and drinking, which may include:  · 8 hours before the procedure - stop eating heavy meals or foods such as meat, fried foods, or fatty foods.  · 6 hours before the procedure - stop eating light meals or foods, such as toast or cereal.  · 6 hours before the procedure - stop drinking milk or drinks that contain milk.  · 3 hours before the procedure - stop drinking clear liquids.  General instructions  · You will need to remove any dentures or dental retainers.  · Plan to have someone take you home from the hospital or clinic.  · Plan to have a responsible adult care for you for at least 24 hours after you leave the hospital or clinic. This is important.  · Ask your health care provider about:  ? Changing or stopping your normal medicines. This is important if you take diabetes medicines or blood thinners.  ? Taking over-the-counter medicines, vitamins,  herbs, and supplements.  ? Taking medicines such as aspirin and ibuprofen. These medicines can thin your blood. Do not take these medicines unless your health care provider tells you to take them.  What happens during the procedure?  · To reduce your risk of infection:  ? Your health care team will wash or sanitize their hands.  ? Hair may be removed from the surgical area.  ? Your skin will be washed with soap.  · An IV will be inserted into one of your veins.  · You will be given one or both of the following:  ? A medicine to help you relax (sedative).  ? A medicine to be sprayed or gargled in order to numb the back of your throat (local anesthetic).  · Your blood pressure, heart rate, and breathing (vital signs) will be monitored during the procedure.  · You may be asked to lay on your left side.  · A bite block will be placed in your mouth to keep you from biting the tube during the procedure.  · The tip of the HOLLY probe will be placed into the back of your mouth. You will be asked to swallow. This helps to pass the tip of the probe into the esophagus.  · Once the tip of the probe is in the correct area, your health care provider will take pictures of the heart.  · The probe and bite block will be removed when the procedure is done.  The procedure may vary among health care providers and hospitals  What happens after the procedure?  · Your blood pressure, heart rate, breathing rate, and blood oxygen level will be monitored until the medicines you were given have worn off.  · When you first wake up, your throat may feel slightly sore and will probably still feel numb. This will improve slowly over time. You will not be allowed to eat or drink until the numbness has gone away.  · Do not drive for 24 hours if you received a sedative.  Summary  · Transesophageal echocardiogram (HOLLY) is a test that uses sound waves (echocardiogram) to produce very clear, detailed images of the heart.  · HOLLY is done by passing a  flexible tube down the esophagus.  · Generally, this is a safe procedure. However, problems may occur, including damage to other structures or organs, bleeding, irregular heart beat, and a hoarse voice or trouble swallowing.  · Tell your health care provider about any medicines and medical conditions you may have, as some conditions may increase your risk of complications.  This information is not intended to replace advice given to you by your health care provider. Make sure you discuss any questions you have with your health care provider.  Document Released: 03/09/2004 Document Revised: 05/29/2018 Document Reviewed: 03/16/2018  [employer] Patient Education © 2020 [employer]      If your physician has prescribed pain medication that includes Acetaminophen (Tylenol), do not take additional Acetaminophen (Tylenol) while taking the prescribed medication.    Depression / Suicide Risk    As you are discharged from this [facility] facility, it is important to learn how to keep safe from harming yourself.    Recognize the warning signs:  · Abrupt changes in personality, positive or negative- including increase in energy   · Giving away possessions  · Change in eating patterns- significant weight changes-  positive or negative  · Change in sleeping patterns- unable to sleep or sleeping all the time   · Unwillingness or inability to communicate  · Depression  · Unusual sadness, discouragement and loneliness  · Talk of wanting to die  · Neglect of personal appearance   · Rebelliousness- reckless behavior  · Withdrawal from people/activities they love  · Confusion- inability to concentrate     If you or a loved one observes any of these behaviors or has concerns about self-harm, here's what you can do:  · Talk about it- your feelings and reasons for harming yourself  · Remove any means that you might use to hurt yourself (examples: pills, rope, extension cords, firearm)  · Get professional help from the community (Mental  Health, Substance Abuse, psychological counseling)  · Do not be alone:Call your Safe Contact- someone whom you trust who will be there for you.  · Call your local CRISIS HOTLINE 123-7776 or 173-459-7134  · Call your local Children's Mobile Crisis Response Team [place] (723) 263-4568 or www.[employer]  · Call the toll free National Suicide Prevention Hotlines   · National Suicide Prevention Lifeline 567-134-MVIW (5228)  · National Hope Line Network 800-SUICIDE (176-0038)

## 2021-06-11 ENCOUNTER — OFFICE VISIT (OUTPATIENT)
Dept: NEUROLOGY | Facility: MEDICAL CENTER | Age: 66
End: 2021-06-11
Attending: PSYCHIATRY & NEUROLOGY
Payer: MEDICARE

## 2021-06-11 VITALS
SYSTOLIC BLOOD PRESSURE: 136 MMHG | TEMPERATURE: 97.8 F | HEIGHT: 73 IN | HEART RATE: 70 BPM | DIASTOLIC BLOOD PRESSURE: 82 MMHG | WEIGHT: 273.37 LBS | BODY MASS INDEX: 36.23 KG/M2 | OXYGEN SATURATION: 92 %

## 2021-06-11 DIAGNOSIS — G21.19 DRUG-INDUCED PARKINSONISM (HCC): ICD-10-CM

## 2021-06-11 DIAGNOSIS — I63.139 CEREBROVASCULAR ACCIDENT (CVA) DUE TO EMBOLISM OF CAROTID ARTERY, UNSPECIFIED BLOOD VESSEL LATERALITY (HCC): ICD-10-CM

## 2021-06-11 PROCEDURE — 99214 OFFICE O/P EST MOD 30 MIN: CPT | Performed by: PSYCHIATRY & NEUROLOGY

## 2021-06-11 PROCEDURE — 99212 OFFICE O/P EST SF 10 MIN: CPT | Performed by: PSYCHIATRY & NEUROLOGY

## 2021-06-11 ASSESSMENT — PATIENT HEALTH QUESTIONNAIRE - PHQ9: CLINICAL INTERPRETATION OF PHQ2 SCORE: 0

## 2021-06-11 NOTE — PROGRESS NOTES
"[facility] NEUROLOGY  FOLLOW-UP VISIT    CC: history of stroke    INTERVAL HISTORY:  Jeremi Ingram is a 66 y.o. man with a history of hypothyroidism, obesity, CELIA (on CPAP), HTN, T2DM, AF (on apixaban), stroke, and likely drug-induced parkinsonism.  I last saw him in the clinic on 12/3/2020.  At that time I recommended he discuss Abilify discontinuation with behavioral health.  Today, he was accompanied by his wife, and together they provided the following interval history:    [id number]:  One day Jereim noticed that when he closed his left eye he could not see out of part of the right eye (the upper half of his visual field was black).  These visual symptoms never resolved.  Jeremi underwent a workup and was told that he had a \"stroke in his eye.\"    8/2019:  Jeremi experienced a stroke which produced symptoms of left upper extremity weakness and numbness.  He was treated with IV tPA, and he regained function of the left upper extremity pretty completely.    Workup included Loop recorder placement.  He was diagnosed with AF and placed on apixaban.    3/14/2021:  Jeremi noticed \"graying\" of the lower portion of the vision in the right eye.  Again, it took them a while to get into see a doctor.    Eventually, a workup was completed, and pertinent data are summarized below.  HOLLY revealed a small PFO.    5/17/2021:  Jeremi followed up with cardiology, and he was switched from aspirin to apixaban.    Otherwise, the dosage of Abilify was reduced, and Jeremi's tremors have improved.    MEDICATIONS:  Current Outpatient Medications   Medication Sig   • Cholecalciferol (VITAMIN D) 50 MCG (2000 UT) Cap Take  by mouth every day.   • levothyroxine (SYNTHROID) 150 MCG Tab Take 150 mcg by mouth every morning on an empty stomach.   • clopidogrel (PLAVIX) 75 MG Tab Take 1 tablet by mouth every day.   • apixaban (ELIQUIS) 5mg Tab TAKE 1 TABLET TWICE A DAY   • amoxicillin (AMOXIL) 500 MG Cap Take 2,000 mg by mouth " as needed (Takes prior to dental appointments.).   • ARIPiprazole (ABILIFY) 10 MG Tab Take  by mouth every evening.   • STOOL SOFTENER 100 MG Cap Take 100 mg by mouth 2 times a day. Pt takes 1 Cap in the morning and 3 Caps before bed.   • nitroglycerin (NITROSTAT) 0.4 MG SL Tab DISSOLVE 1 TABLET UNDER THE TONGUE EVERY 5 MINUTES AS NEEDED FOR CHEST PAIN. DO NOT EXCEED A TOTAL OF 3 DOSES IN 15 MINUTES. CALL 911 IF NO   • therapeutic multivitamin-minerals (THERAGRAN-M) Tab Take 1 Tab by mouth every day.   • metoprolol SR (TOPROL XL) 25 MG TABLET SR 24 HR Take 1 Tab by mouth every day.   • atorvastatin (LIPITOR) 80 MG tablet Take 1 Tab by mouth every evening.   • tramadol (ULTRAM) 50 MG Tab Take 50 mg by mouth every 8 hours as needed for Severe Pain.   • venlafaxine (EFFEXOR-XR) 150 MG extended-release capsule Take 150 mg by mouth every day.   • omeprazole (PRILOSEC) 20 MG delayed-release capsule Take 20 mg by mouth every day.   • benazepril (LOTENSIN) 5 MG Tab Take 5 mg by mouth every day.   • alfuzosin (UROXATRAL) 10 MG SR tablet Take 10 mg by mouth every day.     MEDICAL, SOCIAL, AND FAMILY HISTORY:  There is no change in the patient's ROS or PFSH from their previous visit on 12/3/2020.    REVIEW OF SYSTEMS:  A ROS was completed.  Pertinent positives and negatives were included in the HPI, above.  All other systems were reviewed and are negative.    PHYSICAL EXAM:  General/Medical:  - NAD  - hair, skin, nails, and joints were normal     Neuro:  MENTAL STATUS: awake and alert; no deficits of speech or language; oriented to person, place, and time; affect was appropriate to situation     CRANIAL NERVES:    II: acuity was: finger count/J1+ with glasses; fields intact to confrontation; pupils 3/3 to 2/2 without a relative afferent pupillary defect; discs sharp    III/IV/VI: versions intact without nystagmus    V: facial sensation symmetric to light touch;    VII: facial expression symmetric; mild hypomimia (baseline, per  "wife)    VIII: hearing intact to finger rub    IX/X: palate elevates symmetrically    XI: shoulder shrug symmetric    XII: tongue midline     MOTOR:  - bulk and tone were normal throughout; no rigidity  Upper Extremity Strength  (R/L)    5/5   Elbow flexion 5/5   Elbow extension 5/5   Shoulder abduction 5/5      Lower Extremity Strength  (R/L)   Hip flexion 5/5   Knee extension 5/5   Knee flexion 5/5   Ankle plantarflexion 5/5   Ankle dorsiflexion 5/5      - no pronator drift  - no tremor present during today's visit     SENSATION:  - light touch: intact over the upper and lower extremities  - vibration (R/L, seconds): NT at the great toes  - pinprick: NT  - proprioception: NT  - Romberg: absent     COORDINATION:  - finger to nose was normal, no ataxia on exam  - finger tapping was rapid and accurate, bilaterally     REFLEXES:  Reflex Right Left   BR 1+ 2+   Biceps 1+ 2+   Triceps 1+ 2+   Patellae 2+ 2+   Achilles NT NT   Toes NT NT      GAIT:  - narrow base    REVIEW OF IMAGING STUDIES: I reviewed the following studies:  MRI Brain:  Date: 5/3/2021  W/o and w/ contrast?: without  Indication: \"stroke, follow-up\"  Comparison: 8/26/2019  Impression:  \"1) Age-related cerebral atrophy.  2) Mild periventricular white matter changes consistent with chronic microvascular ischemic gliosis.  3) Gyriform focus of increased T2 signal intensity in the right lateral occipital lobe consistent with subacute/chronic area of cortical infarction.  4) No evidence of acute infarction in the brain parenchyma.\"    HOLLY:  Date: 6/3/2021  Impression:  \"Left atrial appendage is free of thrombus or sludge. THOMAS emptying velocity is approxiamtely to 20 cm/sec.  A small right to left shunt is present across the inter-atrial septum on color doppler that is visualized intermittently with respiration. An agitated saline contrast study was performed and was negative for a right to left shunt. Suspect small PFO. \"    REVIEW OF LABORATORY " "STUDIES:  6/3/2021:  - CBC: WNL  - BMP: WNL    ASSESSMENT:  Jeremi Ingram is a 66 y.o. man with hypothyroidism, obesity, CELIA (on CPAP), HTN, T2DM, AF (on apixaban), stroke, and likely drug-induced parkinsonism.  Jeremi's tremors seem to be improved since the dosage of Abilify was reduced.  Both he and his wife are in favor of continuing Abilify for now given the severity of his mood symptoms.  Jeremi has a history of stroke and has been anticoagulated with apixaban for several years.  He recently experienced worsened monocular visual acuity on the right.  Repeat MRI brain obtained weeks after the onset of new symptoms showed a new infarct in the right occipital lobe.  This new lesion would not explain monocular visual symptoms.  As suggested by cardiology, I suppose cardio-embolism to the retina itself could explain this presentation.  His secondary stroke prevention regimen now includes apixaban, clopidogrel (he was recently switched from aspirin to this), and atorvastatin.  His blood pressure today was 95262.  At this point he seems to be well-optimized with regards to secondary stroke prevention.  We will follow up in approximately 4 months.    PLAN:  History of Stroke:  - continue apixaban, clopidogrel, and atorvastatin  - BP control per primary team    Follow-Up:  - Return in about 4 months (around 10/11/2021).    Signed: Alessio Ceballos M.D. at 8:27 AM on 06/11/21    BILLING DOCUMENTATION:   I spent 32 minutes reviewing the medical record, interviewing and examining the patient, discussing my impression (see \"assessment\" above), and coordinating care.  "

## 2021-06-16 ENCOUNTER — NON-PROVIDER VISIT (OUTPATIENT)
Dept: CARDIOLOGY | Facility: MEDICAL CENTER | Age: 66
End: 2021-06-16
Payer: MEDICARE

## 2021-06-16 DIAGNOSIS — Z95.818 STATUS POST PLACEMENT OF IMPLANTABLE LOOP RECORDER: ICD-10-CM

## 2021-06-16 DIAGNOSIS — I63.9 CRYPTOGENIC STROKE (HCC): ICD-10-CM

## 2021-06-16 PROCEDURE — 93298 REM INTERROG DEV EVAL SCRMS: CPT | Performed by: INTERNAL MEDICINE

## 2021-07-07 ENCOUNTER — OFFICE VISIT (OUTPATIENT)
Dept: CARDIOLOGY | Facility: MEDICAL CENTER | Age: 66
End: 2021-07-07
Payer: MEDICARE

## 2021-07-07 VITALS
SYSTOLIC BLOOD PRESSURE: 118 MMHG | OXYGEN SATURATION: 95 % | DIASTOLIC BLOOD PRESSURE: 76 MMHG | WEIGHT: 276 LBS | HEART RATE: 72 BPM | HEIGHT: 73 IN | BODY MASS INDEX: 36.58 KG/M2 | RESPIRATION RATE: 18 BRPM

## 2021-07-07 DIAGNOSIS — I63.139 CEREBROVASCULAR ACCIDENT (CVA) DUE TO EMBOLISM OF CAROTID ARTERY, UNSPECIFIED BLOOD VESSEL LATERALITY (HCC): ICD-10-CM

## 2021-07-07 DIAGNOSIS — Z45.09 ENCOUNTER FOR LOOP RECORDER CHECK: ICD-10-CM

## 2021-07-07 DIAGNOSIS — I48.0 PAROXYSMAL A-FIB (HCC): ICD-10-CM

## 2021-07-07 PROCEDURE — 99214 OFFICE O/P EST MOD 30 MIN: CPT | Mod: 25 | Performed by: INTERNAL MEDICINE

## 2021-07-07 PROCEDURE — 93000 ELECTROCARDIOGRAM COMPLETE: CPT | Mod: 59 | Performed by: INTERNAL MEDICINE

## 2021-07-07 PROCEDURE — 93291 INTERROG DEV EVAL SCRMS IP: CPT | Mod: 26 | Performed by: INTERNAL MEDICINE

## 2021-07-07 RX ORDER — PANTOPRAZOLE SODIUM 20 MG/1
TABLET, DELAYED RELEASE ORAL
Status: ON HOLD | COMMUNITY
Start: 2021-06-17 | End: 2023-05-26

## 2021-07-07 NOTE — PROGRESS NOTES
"Arrhythmia Clinic Note (Established patient)    DOS: 7/7/2021    Chief complaint/Reason for consult: F/u AF/stroke    Interval History:  Patient is a 65 yo M. History of HTN, CELIA, prior stroke s/p ILR. Placed on Eliquis and then subsequently with ?failure of antiocoag due to new lesions on MR (initially obtained due to new visual symptoms which did not correlated with lesions on MR). I increased his anticoagulation regimen by addition of Plavix to regimen which has thus far been working. HOLLY was done to assess for THOMAS appendage thrombus/flow. Despite sinus rhythm during study, max velocities were still borderline low ~20cm/s. Here for follow-up. No new complaints today.    ROS (+ highlighted in red):  General--Negative for fatigue, weight loss or weight gain  Cardiovascular--Negative for CP, orthopnea, PND    Past Medical History:   Diagnosis Date   • Atrial fibrillation (HCC)    • Bipolar 1 disorder (HCC)    • Heart burn    • High cholesterol    • HTN (hypertension)    • Hypothyroidism    • CELIA (obstructive sleep apnea)    • Stroke (HCC) 08/25/2019    \"Some vision changes\", 3/14//21 subsequent episode       Past Surgical History:   Procedure Laterality Date   • ROTATOR CUFF REPAIR Left 2019   • OTHER  2019    TPA intervention with Loop recorder   • PB POLYSOMNOGRAPHY W/CPAP  4/6/2017        • SHOULDER ARTHROSCOPY Right 2017   • ROTATOR CUFF REPAIR Right 2013    with graft   • ROTATOR CUFF REPAIR Right 2012   • WRIST FUSION  1997    right, surgeries x 3   • OTHER ORTHOPEDIC SURGERY  2007, 2009    Neck x2   • TONSILLECTOMY      in childhood       Social History     Socioeconomic History   • Marital status:      Spouse name: Not on file   • Number of children: Not on file   • Years of education: Not on file   • Highest education level: Not on file   Occupational History   • Not on file   Tobacco Use   • Smoking status: Former Smoker     Packs/day: 2.00     Years: 10.00     Pack years: 20.00     Types: " Cigarettes     Quit date: 1999     Years since quittin.8   • Smokeless tobacco: Never Used   Vaping Use   • Vaping Use: Never used   Substance and Sexual Activity   • Alcohol use: Never   • Drug use: Yes     Types: Marijuana, Inhaled     Comment: Occ - marijuana   • Sexual activity: Not on file   Other Topics Concern   • Not on file   Social History Narrative   • Not on file     Social Determinants of Health     Financial Resource Strain:    • Difficulty of Paying Living Expenses:    Food Insecurity:    • Worried About Running Out of Food in the Last Year:    • Ran Out of Food in the Last Year:    Transportation Needs:    • Lack of Transportation (Medical):    • Lack of Transportation (Non-Medical):    Physical Activity:    • Days of Exercise per Week:    • Minutes of Exercise per Session:    Stress:    • Feeling of Stress :    Social Connections:    • Frequency of Communication with Friends and Family:    • Frequency of Social Gatherings with Friends and Family:    • Attends Hinduism Services:    • Active Member of Clubs or Organizations:    • Attends Club or Organization Meetings:    • Marital Status:    Intimate Partner Violence:    • Fear of Current or Ex-Partner:    • Emotionally Abused:    • Physically Abused:    • Sexually Abused:        Family History   Problem Relation Age of Onset   • Stroke Mother        Allergies   Allergen Reactions   • Gabapentin      Other reaction(s): Other (See Comments)  Leg pain and weakness  Other reaction(s): Leg pain and weakness, Other (See Comments)  Leg pain and weakness  Other reaction(s): Other (See Comments)  Leg pain and weakness     • Lithium      Visual disturbance  Other reaction(s): Coma, Other, Other (See Comments)  Built up in system and created high lvl's in lab results  TOXICITY  Visual disturbance  TOXICITY     • Morphine Unspecified     Hallucinations  Other reaction(s): Other (See Comments)  Hallucinations  Hallucinations   • Morphine Sulfate       Other reaction(s): Hallucinations       Current Outpatient Medications   Medication Sig Dispense Refill   • Cholecalciferol (VITAMIN D) 50 MCG (2000 UT) Cap Take  by mouth every day.     • levothyroxine (SYNTHROID) 150 MCG Tab Take 150 mcg by mouth every morning on an empty stomach.     • clopidogrel (PLAVIX) 75 MG Tab Take 1 tablet by mouth every day. 90 tablet 3   • apixaban (ELIQUIS) 5mg Tab TAKE 1 TABLET TWICE A  tablet 3   • amoxicillin (AMOXIL) 500 MG Cap Take 2,000 mg by mouth as needed (Takes prior to dental appointments.).     • ARIPiprazole (ABILIFY) 10 MG Tab Take  by mouth every evening.     • STOOL SOFTENER 100 MG Cap Take 100 mg by mouth 2 times a day. Pt takes 1 Cap in the morning and 3 Caps before bed.     • nitroglycerin (NITROSTAT) 0.4 MG SL Tab DISSOLVE 1 TABLET UNDER THE TONGUE EVERY 5 MINUTES AS NEEDED FOR CHEST PAIN. DO NOT EXCEED A TOTAL OF 3 DOSES IN 15 MINUTES. CALL 911 IF NO     • therapeutic multivitamin-minerals (THERAGRAN-M) Tab Take 1 Tab by mouth every day.     • metoprolol SR (TOPROL XL) 25 MG TABLET SR 24 HR Take 1 Tab by mouth every day. 90 Tab 3   • atorvastatin (LIPITOR) 80 MG tablet Take 1 Tab by mouth every evening. 90 Tab 3   • tramadol (ULTRAM) 50 MG Tab Take 50 mg by mouth every 8 hours as needed for Severe Pain.     • venlafaxine (EFFEXOR-XR) 150 MG extended-release capsule Take 150 mg by mouth every day.     • omeprazole (PRILOSEC) 20 MG delayed-release capsule Take 20 mg by mouth every day.     • benazepril (LOTENSIN) 5 MG Tab Take 5 mg by mouth every day.     • alfuzosin (UROXATRAL) 10 MG SR tablet Take 10 mg by mouth every day.     • pantoprazole (PROTONIX) 20 MG tablet  (Patient not taking: Reported on 7/7/2021)       No current facility-administered medications for this visit.       Physical Exam:  Vitals:    07/07/21 1014   BP: 118/76   BP Location: Left arm   Patient Position: Sitting   BP Cuff Size: Adult   Pulse: 72   Resp: 18   SpO2: 95%   Weight: (!) 125  "kg (276 lb)   Height: 1.854 m (6' 1\")     General appearance: NAD, conversant  HEENT: PERRL, neck is supple with FROM  Lungs: Clear to auscultation, normal respiratory effort  CV: RRR, no murmurs/rubs/gallops, no JVD  Abdomen: Soft, non-tender with normal bowel sounds  Extremities: No peripheral edema, no clubbing or cyanosis  Skin: No rash, lesions, or ulcers  Psych: Alert and oriented to person, place and time    Data:  Labs reviewed    Prior echo/stress reviewed:  HOLLY images reviewed as above    EKG interpreted by me:  Sinus, low amplitude p waves, RBBB      Impression/Plan:  1. Paroxysmal A-fib (HCC)  EKG   2. 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 Encounter for loop recorder check       -?small PFO on HOLLY, I do not think clinically relevant at his age and indefinite OAC status  -Low appendage velocities on HOLLY, despite sinus rhythm, I think aggressive OAC with Eliquis + plavix reasonable and tolerating thus far  -Loop check showing very low burden atrial arrhythmias, I would just continue BB for this for now  -F/u in 6 mo    Babak Stacy MD    "

## 2021-07-21 ENCOUNTER — NON-PROVIDER VISIT (OUTPATIENT)
Dept: CARDIOLOGY | Facility: MEDICAL CENTER | Age: 66
End: 2021-07-21
Payer: MEDICARE

## 2021-07-21 DIAGNOSIS — I63.9 CRYPTOGENIC STROKE (HCC): ICD-10-CM

## 2021-07-21 DIAGNOSIS — Z95.818 STATUS POST PLACEMENT OF IMPLANTABLE LOOP RECORDER: ICD-10-CM

## 2021-07-24 LAB — EKG IMPRESSION: NORMAL

## 2021-08-23 ENCOUNTER — NON-PROVIDER VISIT (OUTPATIENT)
Dept: CARDIOLOGY | Facility: MEDICAL CENTER | Age: 66
End: 2021-08-23
Payer: MEDICARE

## 2021-08-23 DIAGNOSIS — Z95.818 STATUS POST PLACEMENT OF IMPLANTABLE LOOP RECORDER: ICD-10-CM

## 2021-08-23 DIAGNOSIS — R55 SYNCOPE AND COLLAPSE: ICD-10-CM

## 2021-08-23 PROCEDURE — 99999 PR NO CHARGE: CPT | Performed by: INTERNAL MEDICINE

## 2021-09-24 ENCOUNTER — NON-PROVIDER VISIT (OUTPATIENT)
Dept: CARDIOLOGY | Facility: MEDICAL CENTER | Age: 66
End: 2021-09-24
Payer: MEDICARE

## 2021-09-24 DIAGNOSIS — I63.9 CRYPTOGENIC STROKE (HCC): ICD-10-CM

## 2021-09-24 DIAGNOSIS — Z95.818 STATUS POST PLACEMENT OF IMPLANTABLE LOOP RECORDER: ICD-10-CM

## 2021-09-24 PROCEDURE — 99999 PR NO CHARGE: CPT | Performed by: INTERNAL MEDICINE

## 2021-10-12 ENCOUNTER — OFFICE VISIT (OUTPATIENT)
Dept: NEUROLOGY | Facility: MEDICAL CENTER | Age: 66
End: 2021-10-12
Attending: PSYCHIATRY & NEUROLOGY
Payer: MEDICARE

## 2021-10-12 VITALS
SYSTOLIC BLOOD PRESSURE: 124 MMHG | HEART RATE: 74 BPM | BODY MASS INDEX: 36.96 KG/M2 | WEIGHT: 278.88 LBS | DIASTOLIC BLOOD PRESSURE: 82 MMHG | OXYGEN SATURATION: 95 % | HEIGHT: 73 IN

## 2021-10-12 DIAGNOSIS — G21.19 DRUG-INDUCED PARKINSONISM (HCC): ICD-10-CM

## 2021-10-12 DIAGNOSIS — I63.139 CEREBROVASCULAR ACCIDENT (CVA) DUE TO EMBOLISM OF CAROTID ARTERY, UNSPECIFIED BLOOD VESSEL LATERALITY (HCC): ICD-10-CM

## 2021-10-12 PROCEDURE — 99211 OFF/OP EST MAY X REQ PHY/QHP: CPT | Performed by: PSYCHIATRY & NEUROLOGY

## 2021-10-12 PROCEDURE — 99214 OFFICE O/P EST MOD 30 MIN: CPT | Performed by: PSYCHIATRY & NEUROLOGY

## 2021-10-12 NOTE — PROGRESS NOTES
[facility] NEUROLOGY  FOLLOW-UP VISIT    CC: history of stroke    INTERVAL HISTORY:  Jeremi Ingram is a 66 y.o. man with a history of hypothyroidism, obesity, CELIA (on CPAP), HTN, T2DM, AF (on apixaban), stroke, and likely drug-induced parkinsonism.  I last saw him in the clinic on 6/11/2021.  At that time I recommended he continue apixaban, clopidogrel, and atorvastatin.  Today, he was accompanied by his wife, and together they provided the following interval history:    Jeremi's condition remains stable since the last visit.  He has not noticed any new or worsened neurologic symptoms.  He continues on his medication regimen of apixaban, clopidogrel, and atorvastatin.  He has not noticed any side effects including bruising.  He has not had any falls.    Otherwise, Jeremi continues on Abilify.  His tremors are stable and tolerable.    MEDICATIONS:  Current Outpatient Medications   Medication Sig   • pantoprazole (PROTONIX) 20 MG tablet    • Cholecalciferol (VITAMIN D) 50 MCG (2000 UT) Cap Take  by mouth every day.   • levothyroxine (SYNTHROID) 150 MCG Tab Take 150 mcg by mouth every morning on an empty stomach.   • clopidogrel (PLAVIX) 75 MG Tab Take 1 tablet by mouth every day.   • apixaban (ELIQUIS) 5mg Tab TAKE 1 TABLET TWICE A DAY   • amoxicillin (AMOXIL) 500 MG Cap Take 2,000 mg by mouth as needed (Takes prior to dental appointments.).   • ARIPiprazole (ABILIFY) 10 MG Tab Take  by mouth every evening.   • STOOL SOFTENER 100 MG Cap Take 100 mg by mouth 2 times a day. Pt takes 1 Cap in the morning and 3 Caps before bed.   • nitroglycerin (NITROSTAT) 0.4 MG SL Tab DISSOLVE 1 TABLET UNDER THE TONGUE EVERY 5 MINUTES AS NEEDED FOR CHEST PAIN. DO NOT EXCEED A TOTAL OF 3 DOSES IN 15 MINUTES. CALL 911 IF NO   • therapeutic multivitamin-minerals (THERAGRAN-M) Tab Take 1 Tab by mouth every day.   • metoprolol SR (TOPROL XL) 25 MG TABLET SR 24 HR Take 1 Tab by mouth every day.   • atorvastatin (LIPITOR)  80 MG tablet Take 1 Tab by mouth every evening.   • tramadol (ULTRAM) 50 MG Tab Take 50 mg by mouth every 8 hours as needed for Severe Pain.   • venlafaxine (EFFEXOR-XR) 150 MG extended-release capsule Take 150 mg by mouth every day.   • benazepril (LOTENSIN) 5 MG Tab Take 5 mg by mouth every day.   • alfuzosin (UROXATRAL) 10 MG SR tablet Take 10 mg by mouth every day.     MEDICAL, SOCIAL, AND FAMILY HISTORY:  There is no change in the patient's ROS or PFSH from their previous visit on 6/11/2021.    REVIEW OF SYSTEMS:  A ROS was completed.  Pertinent positives and negatives were included in the HPI, above.  All other systems were reviewed and are negative.    PHYSICAL EXAM:  General/Medical:  - NAD  - hair, skin, nails, and joints were normal     Neuro:  MENTAL STATUS: awake and alert; no deficits of speech or language; oriented to person, place, and time; affect was appropriate to situation; pleasant, cooperative     CRANIAL NERVES:    II: acuity was: finger count/J1+ with glasses, fields: intact to confrontation, pupils: 3/3 to 2/2 with a RAPD on the right, discs: sharp, mild pallor on the right    III/IV/VI: versions intact without nystagmus    V: facial sensation symmetric to light touch;    VII: facial expression symmetric; mild hypomimia (baseline, per wife)    VIII: hearing intact to finger rub    IX/X: palate elevates symmetrically    XI: shoulder shrug symmetric    XII: tongue midline     MOTOR:  - bulk: normal  - tone: NT  Upper Extremity Strength  (R/L)    5/5   Elbow flexion 5/5   Elbow extension 5/5   Shoulder abduction 5/5      Lower Extremity Strength  (R/L)   Hip flexion 5/5   Knee extension 5/5   Knee flexion 5/5   Ankle plantarflexion NT   Ankle dorsiflexion NT      - no pronator drift  - no tremor present during today's visit     SENSATION:  - light touch: intact over the upper and lower extremities  - vibration (R/L, seconds): NT at the great toes  - pinprick: NT  - proprioception: NT  -  "Romberg: NT     COORDINATION:  - finger to nose: normal, no ataxia on exam  - finger tapping: rapid and accurate, bilaterally     REFLEXES:  Reflex Right Left   BR 1+ 2+   Biceps 1+ 2+   Triceps 1+ 2+   Patellae 2+ 2+   Achilles NT NT   Toes NT NT      GAIT:  - narrow base    REVIEW OF IMAGING STUDIES:  No additional images since the last visit.    REVIEW OF LABORATORY STUDIES:  No additional data since the last visit.    ASSESSMENT:  Jeremi Ingram is a 66 y.o. man with hypothyroidism, obesity, CELIA (on CPAP), HTN, T2DM, AF (on apixaban), stroke, and likely drug-induced parkinsonism.  Jeremi remains stable on his current medication regimen.  He has no specific complaints today.  I do not recommend any medication changes at this time.  We will follow up on an as-needed basis.    PLAN:  History of Stroke:  - continue apixaban, clopidogrel, and atorvastatin  - BP control per primary team    Follow-Up:  - Return if symptoms worsen or fail to improve.    Signed: Alessio Ceballos M.D.    BILLING DOCUMENTATION:   I spent 33 minutes reviewing the medical record, interviewing and examining the patient, discussing my impression (see \"assessment\" above), and coordinating care.  "

## 2021-10-18 PROCEDURE — 93298 REM INTERROG DEV EVAL SCRMS: CPT | Performed by: INTERNAL MEDICINE

## 2021-10-25 ENCOUNTER — NON-PROVIDER VISIT (OUTPATIENT)
Dept: CARDIOLOGY | Facility: MEDICAL CENTER | Age: 66
End: 2021-10-25
Payer: MEDICARE

## 2021-10-25 DIAGNOSIS — Z95.818 STATUS POST PLACEMENT OF IMPLANTABLE LOOP RECORDER: ICD-10-CM

## 2021-10-25 DIAGNOSIS — I63.9 CRYPTOGENIC STROKE (HCC): ICD-10-CM

## 2021-10-25 PROCEDURE — 93298 REM INTERROG DEV EVAL SCRMS: CPT | Performed by: INTERNAL MEDICINE

## 2021-11-29 ENCOUNTER — NON-PROVIDER VISIT (OUTPATIENT)
Dept: CARDIOLOGY | Facility: MEDICAL CENTER | Age: 66
End: 2021-11-29
Payer: MEDICARE

## 2021-11-29 DIAGNOSIS — Z95.818 STATUS POST PLACEMENT OF IMPLANTABLE LOOP RECORDER: ICD-10-CM

## 2021-11-29 PROCEDURE — 93298 REM INTERROG DEV EVAL SCRMS: CPT | Performed by: INTERNAL MEDICINE

## 2021-12-30 ENCOUNTER — NON-PROVIDER VISIT (OUTPATIENT)
Dept: CARDIOLOGY | Facility: MEDICAL CENTER | Age: 66
End: 2021-12-30
Payer: MEDICARE

## 2021-12-30 DIAGNOSIS — I63.9 CRYPTOGENIC STROKE (HCC): ICD-10-CM

## 2021-12-30 DIAGNOSIS — Z95.818 STATUS POST PLACEMENT OF IMPLANTABLE LOOP RECORDER: ICD-10-CM

## 2021-12-30 PROCEDURE — 99999 PR NO CHARGE: CPT | Performed by: INTERNAL MEDICINE

## 2022-01-09 ENCOUNTER — NON-PROVIDER VISIT (OUTPATIENT)
Dept: CARDIOLOGY | Facility: MEDICAL CENTER | Age: 67
End: 2022-01-09
Payer: MEDICARE

## 2022-01-09 PROCEDURE — 99999 PR NO CHARGE: CPT | Performed by: INTERNAL MEDICINE

## 2022-02-09 ENCOUNTER — NON-PROVIDER VISIT (OUTPATIENT)
Dept: CARDIOLOGY | Facility: MEDICAL CENTER | Age: 67
End: 2022-02-09
Payer: MEDICARE

## 2022-02-09 DIAGNOSIS — Z95.818 STATUS POST PLACEMENT OF IMPLANTABLE LOOP RECORDER: ICD-10-CM

## 2022-02-09 DIAGNOSIS — I48.0 PAROXYSMAL A-FIB (HCC): ICD-10-CM

## 2022-02-09 DIAGNOSIS — I63.139 CEREBROVASCULAR ACCIDENT (CVA) DUE TO EMBOLISM OF CAROTID ARTERY, UNSPECIFIED BLOOD VESSEL LATERALITY (HCC): ICD-10-CM

## 2022-02-09 PROCEDURE — 93298 REM INTERROG DEV EVAL SCRMS: CPT | Performed by: INTERNAL MEDICINE

## 2022-04-06 DIAGNOSIS — I48.0 PAF (PAROXYSMAL ATRIAL FIBRILLATION) (HCC): ICD-10-CM

## 2022-04-12 ENCOUNTER — NON-PROVIDER VISIT (OUTPATIENT)
Dept: CARDIOLOGY | Facility: MEDICAL CENTER | Age: 67
End: 2022-04-12
Payer: MEDICARE

## 2022-04-12 DIAGNOSIS — Z95.818 STATUS POST PLACEMENT OF IMPLANTABLE LOOP RECORDER: ICD-10-CM

## 2022-04-12 DIAGNOSIS — I63.9 CRYPTOGENIC STROKE (HCC): ICD-10-CM

## 2022-04-12 PROCEDURE — 93298 REM INTERROG DEV EVAL SCRMS: CPT | Performed by: INTERNAL MEDICINE

## 2022-04-12 RX ORDER — CLOPIDOGREL BISULFATE 75 MG/1
TABLET ORAL
Qty: 90 TABLET | Refills: 0 | Status: SHIPPED | OUTPATIENT
Start: 2022-04-12 | End: 2022-10-04

## 2022-05-13 ENCOUNTER — NON-PROVIDER VISIT (OUTPATIENT)
Dept: CARDIOLOGY | Facility: MEDICAL CENTER | Age: 67
End: 2022-05-13

## 2022-05-18 NOTE — CARDIAC REMOTE MONITOR - SCAN
Device transmission reviewed. Device demonstrated appropriate function.       Electronically Signed by: Lee Fuentes M.D.    6/3/2022  1:00 PM

## 2022-07-14 ENCOUNTER — NON-PROVIDER VISIT (OUTPATIENT)
Dept: CARDIOLOGY | Facility: MEDICAL CENTER | Age: 67
End: 2022-07-14
Payer: MEDICARE

## 2022-07-14 PROCEDURE — 93298 REM INTERROG DEV EVAL SCRMS: CPT | Performed by: INTERNAL MEDICINE

## 2022-07-28 DIAGNOSIS — I48.0 PAF (PAROXYSMAL ATRIAL FIBRILLATION) (HCC): ICD-10-CM

## 2022-07-29 NOTE — TELEPHONE ENCOUNTER
Is the patient due for a refill? Yes    Was the patient seen the past year? No    Date of last office visit: 7/7/21    Does the patient have an upcoming appointment?  No    Provider to refill:SS    Does the patients insurance require a 100 day supply?  No

## 2022-08-14 ENCOUNTER — NON-PROVIDER VISIT (OUTPATIENT)
Dept: CARDIOLOGY | Facility: MEDICAL CENTER | Age: 67
End: 2022-08-14
Payer: MEDICARE

## 2022-08-14 PROCEDURE — 93298 REM INTERROG DEV EVAL SCRMS: CPT | Performed by: INTERNAL MEDICINE

## 2022-08-15 NOTE — CARDIAC REMOTE MONITOR - SCAN
Device transmission reviewed. Device demonstrated appropriate function.       Electronically Signed by: Christian West M.D.    8/17/2022  7:50 AM

## 2022-09-14 ENCOUNTER — NON-PROVIDER VISIT (OUTPATIENT)
Dept: CARDIOLOGY | Facility: MEDICAL CENTER | Age: 67
End: 2022-09-14
Payer: MEDICARE

## 2022-09-14 PROCEDURE — 93298 REM INTERROG DEV EVAL SCRMS: CPT | Performed by: INTERNAL MEDICINE

## 2022-09-14 NOTE — CARDIAC REMOTE MONITOR - SCAN
Device transmission reviewed. Device demonstrated appropriate function.       Electronically Signed by: Babak Stacy M.D.    9/21/2022  3:36 PM

## 2022-10-15 ENCOUNTER — NON-PROVIDER VISIT (OUTPATIENT)
Dept: CARDIOLOGY | Facility: MEDICAL CENTER | Age: 67
End: 2022-10-15
Payer: MEDICARE

## 2022-10-15 PROCEDURE — 93298 REM INTERROG DEV EVAL SCRMS: CPT | Performed by: INTERNAL MEDICINE

## 2022-10-17 NOTE — CARDIAC REMOTE MONITOR - SCAN
Device transmission reviewed. Device demonstrated appropriate function.       Electronically Signed by: Babak Stacy M.D.    10/18/2022  2:19 PM

## 2022-11-03 ENCOUNTER — OFFICE VISIT (OUTPATIENT)
Dept: CARDIOLOGY | Facility: MEDICAL CENTER | Age: 67
End: 2022-11-03
Payer: MEDICARE

## 2022-11-03 VITALS
WEIGHT: 281 LBS | BODY MASS INDEX: 37.07 KG/M2 | SYSTOLIC BLOOD PRESSURE: 122 MMHG | OXYGEN SATURATION: 96 % | HEART RATE: 65 BPM | RESPIRATION RATE: 16 BRPM | DIASTOLIC BLOOD PRESSURE: 60 MMHG

## 2022-11-03 DIAGNOSIS — I63.139 CEREBROVASCULAR ACCIDENT (CVA) DUE TO EMBOLISM OF CAROTID ARTERY, UNSPECIFIED BLOOD VESSEL LATERALITY (HCC): ICD-10-CM

## 2022-11-03 DIAGNOSIS — I48.0 PAROXYSMAL A-FIB (HCC): ICD-10-CM

## 2022-11-03 DIAGNOSIS — Z45.09 ENCOUNTER FOR LOOP RECORDER CHECK: ICD-10-CM

## 2022-11-03 DIAGNOSIS — I10 PRIMARY HYPERTENSION: ICD-10-CM

## 2022-11-03 DIAGNOSIS — G47.33 OSA (OBSTRUCTIVE SLEEP APNEA): ICD-10-CM

## 2022-11-03 PROCEDURE — 99213 OFFICE O/P EST LOW 20 MIN: CPT | Performed by: INTERNAL MEDICINE

## 2022-11-03 PROCEDURE — 93291 INTERROG DEV EVAL SCRMS IP: CPT | Performed by: INTERNAL MEDICINE

## 2022-11-03 PROCEDURE — 93000 ELECTROCARDIOGRAM COMPLETE: CPT | Mod: 59 | Performed by: INTERNAL MEDICINE

## 2022-11-03 RX ORDER — LEVOTHYROXINE SODIUM 200 MCG
TABLET ORAL
COMMUNITY
Start: 2022-10-03 | End: 2023-05-25

## 2022-11-03 NOTE — PROGRESS NOTES
"Arrhythmia Clinic Note (Established patient)    DOS: 11/3/2022    Chief complaint/Reason for consult: F/u ILR/AF    Interval History:  Pt is a 66 yo M. History of AF seen on ILR after stroke. Refractory initially to OAC with Eliquis. HOLLY had shown low THOMAS velocities even in sinus rhythm. We added plavix to the regimen as well. Here for ILR check. No complaints today. ILR showing slightly higher AF burden. Most episodes lasting minutes.    ROS (+ highlighted in red):  General--Negative for fatigue, weight loss or weight gain  Cardiovascular--Negative for CP, orthopnea, PND    Past Medical History:   Diagnosis Date    Atrial fibrillation (HCC)     Bipolar 1 disorder (HCC)     Heart burn     High cholesterol     HTN (hypertension)     Hypothyroidism     CELIA (obstructive sleep apnea)     Stroke (HCC) 2019    \"Some vision changes\", 3/14//21 subsequent episode       Past Surgical History:   Procedure Laterality Date    ROTATOR CUFF REPAIR Left 2019    OTHER  2019    TPA intervention with Loop recorder    [place] POLYSOMNOGRAPHY W CPAP  2017         SHOULDER ARTHROSCOPY Right 2017    ROTATOR CUFF REPAIR Right     with graft    ROTATOR CUFF REPAIR Right     WRIST FUSION  1997    right, surgeries x 3    OTHER ORTHOPEDIC SURGERY  ,     Neck x2    TONSILLECTOMY      in childhood       Social History     Socioeconomic History    Marital status:      Spouse name: Not on file    Number of children: Not on file    Years of education: Not on file    Highest education level: Not on file   Occupational History    Not on file   Tobacco Use    Smoking status: Former     Packs/day: 2.00     Years: 10.00     Pack years: 20.00     Types: Cigarettes     Quit date: 1999     Years since quittin.2    Smokeless tobacco: Never   Vaping Use    Vaping Use: Never used   Substance and Sexual Activity    Alcohol use: Never    Drug use: Yes     Types: Marijuana, Inhaled     Comment: Occ - marijuana    Sexual " activity: Not on file   Other Topics Concern    Not on file   Social History Narrative    Not on file     Social Determinants of Health     Financial Resource Strain: Not on file   Food Insecurity: Not on file   Transportation Needs: Not on file   Physical Activity: Not on file   Stress: Not on file   Social Connections: Not on file   Intimate Partner Violence: Not on file   Housing Stability: Not on file       Family History   Problem Relation Age of Onset    Stroke Mother        Allergies   Allergen Reactions    Gabapentin      Other reaction(s): Other (See Comments)  Leg pain and weakness  Other reaction(s): Leg pain and weakness, Other (See Comments)  Leg pain and weakness  Other reaction(s): Other (See Comments)  Leg pain and weakness      Lithium      Visual disturbance  Other reaction(s): Coma, Other, Other (See Comments)  Built up in system and created high lvl's in lab results  TOXICITY  Visual disturbance  TOXICITY      Morphine Unspecified     Hallucinations  Other reaction(s): Other (See Comments)  Hallucinations  Hallucinations    Morphine Sulfate      Other reaction(s): Hallucinations       Current Outpatient Medications   Medication Sig Dispense Refill    SYNTHROID 200 MCG Tab       apixaban (ELIQUIS) 5mg Tab TAKE 1 TABLET TWICE A  Tablet 0    clopidogrel (PLAVIX) 75 MG Tab TAKE 1 TABLET DAILY 90 Tablet 0    pantoprazole (PROTONIX) 20 MG tablet       Cholecalciferol (VITAMIN D) 50 MCG (2000 UT) Cap Take  by mouth every day.      amoxicillin (AMOXIL) 500 MG Cap Take 2,000 mg by mouth as needed (Takes prior to dental appointments.).      ARIPiprazole (ABILIFY) 10 MG Tab Take  by mouth every evening.      STOOL SOFTENER 100 MG Cap Take 100 mg by mouth 2 times a day. Pt takes 1 Cap in the morning and 3 Caps before bed.      nitroglycerin (NITROSTAT) 0.4 MG SL Tab DISSOLVE 1 TABLET UNDER THE TONGUE EVERY 5 MINUTES AS NEEDED FOR CHEST PAIN. DO NOT EXCEED A TOTAL OF 3 DOSES IN 15 MINUTES. CALL 911 IF  NO      therapeutic multivitamin-minerals (THERAGRAN-M) Tab Take 1 Tab by mouth every day.      metoprolol SR (TOPROL XL) 25 MG TABLET SR 24 HR Take 1 Tab by mouth every day. 90 Tab 3    atorvastatin (LIPITOR) 80 MG tablet Take 1 Tab by mouth every evening. 90 Tab 3    tramadol (ULTRAM) 50 MG Tab Take 50 mg by mouth every 8 hours as needed for Severe Pain.      venlafaxine (EFFEXOR-XR) 150 MG extended-release capsule Take 150 mg by mouth every day.      benazepril (LOTENSIN) 5 MG Tab Take 5 mg by mouth every day.      alfuzosin (UROXATRAL) 10 MG SR tablet Take 10 mg by mouth every day.      levothyroxine (SYNTHROID) 150 MCG Tab Take 150 mcg by mouth every morning on an empty stomach. (Patient not taking: Reported on 11/3/2022)       No current facility-administered medications for this visit.       Physical Exam:  Vitals:    11/03/22 1520   BP: 122/60   BP Location: Right arm   Patient Position: Sitting   BP Cuff Size: Adult   Pulse: 65   Resp: 16   SpO2: 96%   Weight: (!) 127 kg (281 lb)     General appearance: NAD, conversant  HEENT: PERRL, neck is supple with FROM  Lungs: Clear to auscultation, normal respiratory effort  CV: RRR, no murmurs/rubs/gallops, no JVD  Abdomen: Soft, non-tender with normal bowel sounds  Extremities: No peripheral edema, no clubbing or cyanosis  Skin: No rash, lesions, or ulcers  Psych: Alert and oriented to person, place and time    Data:  Labs reviewed    Prior echo/stress reviewed:  LVEF 65%    EKG interpreted by me:  Sinus rhythm    ILR tracings reviewed    Impression/Plan:  1. Cerebrovascular accident (CVA) due to embolism of carotid artery, unspecified blood vessel laterality (HCC)        2. Paroxysmal A-fib (HCC)  EKG      3. Encounter for loop recorder check        4. CELIA (obstructive sleep apnea)        5. Primary hypertension          -AF episodes more frequent but burden remains <0.1%  -Not symptomatic  -I think we will keep an eye on it, consider antiarrhythmic therapy if  -burden starts rising significantly  -BP at goal  -Compliant with CPAP  -F/u in 3 mo    Babak Stacy MD

## 2022-11-15 ENCOUNTER — NON-PROVIDER VISIT (OUTPATIENT)
Dept: CARDIOLOGY | Facility: MEDICAL CENTER | Age: 67
End: 2022-11-15
Payer: MEDICARE

## 2022-11-15 PROCEDURE — 93298 REM INTERROG DEV EVAL SCRMS: CPT | Performed by: INTERNAL MEDICINE

## 2022-11-15 NOTE — CARDIAC REMOTE MONITOR - SCAN
Device transmission reviewed. Device demonstrated appropriate function.       Electronically Signed by: Christian West M.D.    11/15/2022  12:38 PM

## 2022-11-16 LAB — EKG IMPRESSION: NORMAL

## 2022-12-16 ENCOUNTER — NON-PROVIDER VISIT (OUTPATIENT)
Dept: CARDIOLOGY | Facility: MEDICAL CENTER | Age: 67
End: 2022-12-16
Payer: MEDICARE

## 2022-12-16 PROCEDURE — 93298 REM INTERROG DEV EVAL SCRMS: CPT | Performed by: INTERNAL MEDICINE

## 2022-12-16 NOTE — CARDIAC REMOTE MONITOR - SCAN
Device transmission reviewed. Device demonstrated appropriate function.       Electronically Signed by: Babak Stacy M.D.    12/22/2022  2:23 PM

## 2022-12-20 DIAGNOSIS — I48.0 PAF (PAROXYSMAL ATRIAL FIBRILLATION) (HCC): ICD-10-CM

## 2022-12-22 RX ORDER — CLOPIDOGREL BISULFATE 75 MG/1
TABLET ORAL
Qty: 90 TABLET | Refills: 3 | Status: SHIPPED | OUTPATIENT
Start: 2022-12-22 | End: 2023-12-19

## 2023-01-16 ENCOUNTER — NON-PROVIDER VISIT (OUTPATIENT)
Dept: CARDIOLOGY | Facility: MEDICAL CENTER | Age: 68
End: 2023-01-16
Payer: MEDICARE

## 2023-01-16 PROCEDURE — 93298 REM INTERROG DEV EVAL SCRMS: CPT | Performed by: INTERNAL MEDICINE

## 2023-01-16 NOTE — CARDIAC REMOTE MONITOR - SCAN
Device transmission reviewed. Device demonstrated appropriate function.       Electronically Signed by: Lee Fuentes M.D.    1/16/2023  12:54 PM

## 2023-02-16 ENCOUNTER — NON-PROVIDER VISIT (OUTPATIENT)
Dept: CARDIOLOGY | Facility: MEDICAL CENTER | Age: 68
End: 2023-02-16
Payer: MEDICARE

## 2023-02-16 PROCEDURE — 93298 REM INTERROG DEV EVAL SCRMS: CPT | Performed by: INTERNAL MEDICINE

## 2023-02-16 NOTE — CARDIAC REMOTE MONITOR - SCAN
Device transmission reviewed. Device demonstrated appropriate function.       Electronically Signed by: Christian West M.D.    2/16/2023  2:11 PM

## 2023-03-06 DIAGNOSIS — I48.0 PAF (PAROXYSMAL ATRIAL FIBRILLATION) (HCC): ICD-10-CM

## 2023-03-07 ENCOUNTER — TELEPHONE (OUTPATIENT)
Dept: CARDIOLOGY | Facility: MEDICAL CENTER | Age: 68
End: 2023-03-07
Payer: MEDICARE

## 2023-03-07 DIAGNOSIS — Z95.818 STATUS POST PLACEMENT OF IMPLANTABLE LOOP RECORDER: ICD-10-CM

## 2023-03-08 ENCOUNTER — TELEPHONE (OUTPATIENT)
Dept: CARDIOLOGY | Facility: MEDICAL CENTER | Age: 68
End: 2023-03-08
Payer: MEDICARE

## 2023-03-08 NOTE — TELEPHONE ENCOUNTER
SS     Caller: Gaye Ingram (Spouse)     Medication Name and Dosage: clopidogrel (PLAVIX) 75 MG &  apixaban (ELIQUIS) 5mg     Medication amount left: 15 Days for both    Preferred Pharmacy: [facility] - Mail order pharmacy    Other questions (Topic): N/A    Callback Number (Will only call for issues): [phone] (home)      Thank you,    Darrick THAPA

## 2023-03-10 NOTE — TELEPHONE ENCOUNTER
Babak Stacy M.D.  Maxine Alatorre, Med Ass't; Britney Brooks, R.N.; Otliia Adams, Med Ass't  Caller: Unspecified (3 days ago,  1:41 PM)  Schedule removal

## 2023-03-10 NOTE — TELEPHONE ENCOUNTER
Patient scheduled for ILR removal on 5-26-23 with Dr. Stacy. Patient has been instructed to check in at 10:30 for 11:30 case time. Message sent to authorizations.

## 2023-04-04 ENCOUNTER — APPOINTMENT (OUTPATIENT)
Dept: CARDIOLOGY | Facility: MEDICAL CENTER | Age: 68
End: 2023-04-04
Payer: MEDICARE

## 2023-04-19 ENCOUNTER — NON-PROVIDER VISIT (OUTPATIENT)
Dept: CARDIOLOGY | Facility: MEDICAL CENTER | Age: 68
End: 2023-04-19
Payer: MEDICARE

## 2023-04-19 PROCEDURE — 93298 REM INTERROG DEV EVAL SCRMS: CPT | Performed by: INTERNAL MEDICINE

## 2023-04-19 NOTE — CARDIAC REMOTE MONITOR - SCAN
Device transmission reviewed. Device demonstrated appropriate function.       Electronically Signed by: Babak Stacy M.D.    4/19/2023  12:00 PM

## 2023-05-24 ENCOUNTER — APPOINTMENT (OUTPATIENT)
Dept: ADMISSIONS | Facility: MEDICAL CENTER | Age: 68
End: 2023-05-24
Attending: INTERNAL MEDICINE
Payer: MEDICARE

## 2023-05-25 ENCOUNTER — PRE-ADMISSION TESTING (OUTPATIENT)
Dept: ADMISSIONS | Facility: MEDICAL CENTER | Age: 68
End: 2023-05-25
Attending: INTERNAL MEDICINE
Payer: MEDICARE

## 2023-05-25 RX ORDER — OMEPRAZOLE 20 MG/1
20 CAPSULE, DELAYED RELEASE ORAL DAILY
COMMUNITY

## 2023-05-26 ENCOUNTER — APPOINTMENT (OUTPATIENT)
Dept: CARDIOLOGY | Facility: MEDICAL CENTER | Age: 68
End: 2023-05-26
Attending: INTERNAL MEDICINE
Payer: MEDICARE

## 2023-05-26 ENCOUNTER — HOSPITAL ENCOUNTER (OUTPATIENT)
Facility: MEDICAL CENTER | Age: 68
End: 2023-05-26
Attending: INTERNAL MEDICINE | Admitting: INTERNAL MEDICINE
Payer: MEDICARE

## 2023-05-26 VITALS
RESPIRATION RATE: 20 BRPM | DIASTOLIC BLOOD PRESSURE: 71 MMHG | HEART RATE: 66 BPM | OXYGEN SATURATION: 93 % | WEIGHT: 283.95 LBS | TEMPERATURE: 97.3 F | BODY MASS INDEX: 37.63 KG/M2 | SYSTOLIC BLOOD PRESSURE: 110 MMHG | HEIGHT: 73 IN

## 2023-05-26 DIAGNOSIS — Z95.818 STATUS POST PLACEMENT OF IMPLANTABLE LOOP RECORDER: ICD-10-CM

## 2023-05-26 PROCEDURE — 33286 RMVL SUBQ CAR RHYTHM MNTR: CPT | Performed by: INTERNAL MEDICINE

## 2023-05-26 PROCEDURE — 33286 RMVL SUBQ CAR RHYTHM MNTR: CPT

## 2023-05-26 PROCEDURE — 160002 HCHG RECOVERY MINUTES (STAT)

## 2023-05-26 PROCEDURE — 160046 HCHG PACU - 1ST 60 MINS PHASE II

## 2023-05-26 PROCEDURE — 700101 HCHG RX REV CODE 250

## 2023-05-26 RX ORDER — LEVOTHYROXINE SODIUM 0.2 MG/1
200 TABLET ORAL
COMMUNITY

## 2023-05-26 RX ORDER — LIDOCAINE HYDROCHLORIDE AND EPINEPHRINE BITARTRATE 20; .01 MG/ML; MG/ML
INJECTION, SOLUTION SUBCUTANEOUS
Status: COMPLETED
Start: 2023-05-26 | End: 2023-05-26

## 2023-05-26 RX ORDER — LIDOCAINE HYDROCHLORIDE AND EPINEPHRINE BITARTRATE 20; .01 MG/ML; MG/ML
10 INJECTION, SOLUTION SUBCUTANEOUS ONCE
Status: COMPLETED | OUTPATIENT
Start: 2023-05-26 | End: 2023-05-26

## 2023-05-26 RX ADMIN — LIDOCAINE HYDROCHLORIDE AND EPINEPHRINE BITARTRATE 10 ML: 20; .01 INJECTION, SOLUTION SUBCUTANEOUS at 10:43

## 2023-05-26 RX ADMIN — LIDOCAINE HYDROCHLORIDE,EPINEPHRINE BITARTRATE 10 ML: 20; .01 INJECTION, SOLUTION INFILTRATION; PERINEURAL at 10:43

## 2023-05-26 NOTE — OP REPORT
PROCEDURE PERFORMED: Implantable Loop Recorder EXPLANT    DATE OF SERVICE: 5/26/2023    : Babak Stacy MD    ASSISTANT: None    ANESTHESIA: Local    EBL: <5 cc    SPECIMENS: None    INDICATION(S):  ILR at end of life    DESCRIPTION OF PROCEDURE:  After informed written consent, the patient was brought to the cath lab pre/post procedure area. The patient was prepped and draped in the usual sterile fashion. The procedure was performed with local anesthetic. a <1 cm incision was made An in the skin along the old scar. The device was removed with a pair of curved hemostats. Manual compression was used until hemostasis achieved. Sterile dressing was applied.    EXPLANTED DEVICE INFORMATION:  Model: [id number]   Serial number: [id number]     IMPRESSIONS:  1. Successful implantable loop recorder explant    RECOMMENDATIONS:  1. Routine follow-up

## 2023-05-26 NOTE — OR NURSING
Received patient , s/p loop recorder removable by DR. Stacy.  Patient is awake , alert , oriented x 4 walks with a steady gait .  Small incision noted and Dressing on the chest is dry , clean and intact.  No iV .   1106 HGiven discharge instructions with wife at bedside .  Patient walks out at room 24 ambulatory together with his wife.

## 2023-05-26 NOTE — DISCHARGE INSTRUCTIONS
Keep dressing on for 5 days or until it starts to peel off on its own. You may shower tomorrow with dressing on. It is waterproof.

## 2023-06-13 ENCOUNTER — OFFICE VISIT (OUTPATIENT)
Dept: CARDIOLOGY | Facility: MEDICAL CENTER | Age: 68
End: 2023-06-13
Attending: INTERNAL MEDICINE
Payer: MEDICARE

## 2023-06-13 VITALS
HEIGHT: 73 IN | DIASTOLIC BLOOD PRESSURE: 76 MMHG | OXYGEN SATURATION: 95 % | HEART RATE: 70 BPM | WEIGHT: 280 LBS | SYSTOLIC BLOOD PRESSURE: 108 MMHG | BODY MASS INDEX: 37.11 KG/M2 | RESPIRATION RATE: 14 BRPM

## 2023-06-13 DIAGNOSIS — I48.0 PAROXYSMAL A-FIB (HCC): ICD-10-CM

## 2023-06-13 DIAGNOSIS — I63.139 CEREBROVASCULAR ACCIDENT (CVA) DUE TO EMBOLISM OF CAROTID ARTERY, UNSPECIFIED BLOOD VESSEL LATERALITY (HCC): ICD-10-CM

## 2023-06-13 PROCEDURE — 93010 ELECTROCARDIOGRAM REPORT: CPT | Performed by: INTERNAL MEDICINE

## 2023-06-13 PROCEDURE — 3074F SYST BP LT 130 MM HG: CPT | Performed by: INTERNAL MEDICINE

## 2023-06-13 PROCEDURE — 3078F DIAST BP <80 MM HG: CPT | Performed by: INTERNAL MEDICINE

## 2023-06-13 PROCEDURE — 99212 OFFICE O/P EST SF 10 MIN: CPT | Performed by: INTERNAL MEDICINE

## 2023-06-13 PROCEDURE — 99213 OFFICE O/P EST LOW 20 MIN: CPT | Performed by: INTERNAL MEDICINE

## 2023-06-13 PROCEDURE — 93005 ELECTROCARDIOGRAM TRACING: CPT | Performed by: INTERNAL MEDICINE

## 2023-06-13 NOTE — PROGRESS NOTES
"Arrhythmia Clinic Note (Established patient)    DOS: 2023    Chief complaint/Reason for consult: F/u AF/OAC    Interval History:  Pt is a 67 yo M. History of low burden PAF. Even on OAC had vision disturbance thought to be 2/2 to embolic event. HOLLY done showing even in sinus rhythm low THOMAS velocities. We added plavix to his OAC regimen. Been doing okay on this. More recently loop was removed as at EOL. No complaints today.    ROS (+ highlighted in red):  General--Negative for fatigue, weight loss or weight gain  Cardiovascular--Negative for CP, orthopnea, PND    Past Medical History:   Diagnosis Date    Atrial fibrillation (HCC)     Bipolar 1 disorder (HCC)     Heart burn     High cholesterol     HTN (hypertension)     Hypothyroidism     Indigestion     CELIA (obstructive sleep apnea)     cpap    Stroke (HCC) 2019    \"Some vision changes\", 3/14//21 subsequent episode       Past Surgical History:   Procedure Laterality Date    ROTATOR CUFF REPAIR Left 2019    OTHER  2019    TPA intervention with Loop recorder    NV POLYSOMNOGRAPHY W CPAP  2017         SHOULDER ARTHROSCOPY Right 2017    ROTATOR CUFF REPAIR Right 2013    with graft    ROTATOR CUFF REPAIR Right     WRIST FUSION  1997    right, surgeries x 3    OTHER ORTHOPEDIC SURGERY  ,     Neck x2    TONSILLECTOMY      in childhood       Social History     Socioeconomic History    Marital status:      Spouse name: Not on file    Number of children: Not on file    Years of education: Not on file    Highest education level: Not on file   Occupational History    Not on file   Tobacco Use    Smoking status: Former     Packs/day: 2.00     Years: 10.00     Pack years: 20.00     Types: Cigarettes     Quit date: 1999     Years since quittin.8    Smokeless tobacco: Never   Vaping Use    Vaping Use: Never used   Substance and Sexual Activity    Alcohol use: Not Currently    Drug use: Not Currently     Types: Inhaled    Sexual activity: " Not on file   Other Topics Concern    Not on file   Social History Narrative    Not on file     Social Determinants of Health     Financial Resource Strain: Not on file   Food Insecurity: Not on file   Transportation Needs: Not on file   Physical Activity: Not on file   Stress: Not on file   Social Connections: Not on file   Intimate Partner Violence: Not on file   Housing Stability: Not on file       Family History   Problem Relation Age of Onset    Stroke Mother        Allergies   Allergen Reactions    Gabapentin      Other reaction(s): Other (See Comments)  Leg pain and weakness  Other reaction(s): Leg pain and weakness, Other (See Comments)  Leg pain and weakness  Other reaction(s): Other (See Comments)  Leg pain and weakness      Lithium      Visual disturbance  Other reaction(s): Coma, Other, Other (See Comments)  Built up in system and created high lvl's in lab results  TOXICITY  Visual disturbance  TOXICITY      Morphine Unspecified     Hallucinations  Other reaction(s): Other (See Comments)  Hallucinations  Hallucinations    Morphine Sulfate      Other reaction(s): Hallucinations       Current Outpatient Medications   Medication Sig Dispense Refill    levothyroxine (SYNTHROID) 200 MCG Tab Take 200 mcg by mouth every morning on an empty stomach.      omeprazole (PRILOSEC) 20 MG delayed-release capsule Take 20 mg by mouth every day.      apixaban (ELIQUIS) 5mg Tab TAKE 1 TABLET TWICE A DAY (Patient taking differently: Take 5 mg by mouth 2 times a day. TAKE 1 TABLET TWICE A DAY) 180 Tablet 2    clopidogrel (PLAVIX) 75 MG Tab TAKE 1 TABLET DAILY (Patient taking differently: Take 75 mg by mouth every day.) 90 Tablet 3    Cholecalciferol (VITAMIN D) 50 MCG (2000 UT) Cap Take  by mouth every day.      amoxicillin (AMOXIL) 500 MG Cap Take 2,000 mg by mouth as needed (Takes prior to dental appointments.).      ARIPiprazole (ABILIFY) 10 MG Tab Take 10 mg by mouth every evening.      metoprolol SR (TOPROL XL) 25 MG  "TABLET SR 24 HR Take 1 Tab by mouth every day. 90 Tab 3    atorvastatin (LIPITOR) 80 MG tablet Take 1 Tab by mouth every evening. 90 Tab 3    tramadol (ULTRAM) 50 MG Tab Take 50 mg by mouth every 8 hours as needed for Severe Pain.      venlafaxine (EFFEXOR-XR) 150 MG extended-release capsule Take 150 mg by mouth every day.      benazepril (LOTENSIN) 5 MG Tab Take 5 mg by mouth every day.      alfuzosin (UROXATRAL) 10 MG SR tablet Take 10 mg by mouth every day.      therapeutic multivitamin-minerals (THERAGRAN-M) Tab Take 1 Tablet by mouth every day.       No current facility-administered medications for this visit.       Physical Exam:  Vitals:    06/13/23 1055   BP: 108/76   BP Location: Left arm   Patient Position: Sitting   BP Cuff Size: Adult   Pulse: 70   Resp: 14   SpO2: 95%   Weight: (!) 127 kg (280 lb)   Height: 1.854 m (6' 1\")     General appearance: NAD, conversant  HEENT: PERRL, neck is supple with FROM  Lungs: Clear to auscultation, normal respiratory effort  CV: RRR, no murmurs/rubs/gallops, no JVD  Abdomen: Soft, non-tender with normal bowel sounds  Extremities: No peripheral edema, no clubbing or cyanosis  Skin: No rash, lesions, or ulcers  Psych: Alert and oriented to person, place and time    Data:  Labs reviewed    Prior echo/stress reviewed:  LVEF 65%    EKG interpreted by me:  NSR    Impression/Plan:  1. 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 Paroxysmal A-fib (HCC)  EKG        -Remains in largely sinus rhythm as far as we can tell  -Tolerating Eliquis + plavix combination  -Check CBC/renal function  -F/u in 12 mo    Babak Stacy MD    "

## 2023-08-18 LAB — EKG IMPRESSION: NORMAL

## 2023-08-29 NOTE — PROGRESS NOTES
This evaluation was conducted via Zoom using secure and encrypted videoconferencing technology. The patient was in a private location in the state of [place].    The patient's identity was confirmed and verbal consent was obtained for this virtual visit.    Arrhythmia Clinic Note (Established patient)    DOS: 2020    Chief complaint/Reason for consult: F/u ILR    Interval History:  Patient is a 64 yo with history of T2DM, CELIA, stroke, s/p ILR. Here for virtual follow-up. On anticoagulation. Recently with repeat sleep study. Getting fitted with new mask he says. He is taking OAC and tolerating. Complains of new tremor within last few months. Wonder if its the Abilify he was started on. Requests visit with neurology.     ROS (+in BOLD):  General--Negative for fatigue, weight loss or weight gain  Cardiovascular--Negative for CP, orthopnea, PND    Past Medical History:   Diagnosis Date   • Bipolar 1 disorder (HCC)      Past Surgical History:   Procedure Laterality Date   • PB POLYSOMNOGRAPHY W/CPAP  2017          Social History     Socioeconomic History   • Marital status:      Spouse name: Not on file   • Number of children: Not on file   • Years of education: Not on file   • Highest education level: Not on file   Occupational History   • Not on file   Social Needs   • Financial resource strain: Not on file   • Food insecurity     Worry: Not on file     Inability: Not on file   • Transportation needs     Medical: Not on file     Non-medical: Not on file   Tobacco Use   • Smoking status: Former Smoker     Packs/day: 0.00     Types: Cigarettes     Quit date: 1999     Years since quittin.2   • Smokeless tobacco: Never Used   Substance and Sexual Activity   • Alcohol use: Never     Frequency: Never   • Drug use: Yes     Types: Marijuana     Comment: daily   • Sexual activity: Not on file   Lifestyle   • Physical activity     Days per week: Not on file     Minutes per session: Not on file   •  Stress: Not on file   Relationships   • Social connections     Talks on phone: Not on file     Gets together: Not on file     Attends [employer] service: Not on file     Active member of club or organization: Not on file     Attends meetings of clubs or organizations: Not on file     Relationship status: Not on file   • Intimate partner violence     Fear of current or ex partner: Not on file     Emotionally abused: Not on file     Physically abused: Not on file     Forced sexual activity: Not on file   Other Topics Concern   • Not on file   Social History Narrative   • Not on file       No family history on file.    Allergies   Allergen Reactions   • Gabapentin      Other reaction(s): Other (See Comments)  Leg pain and weakness   • Lithium      Visual disturbance   • Morphine Unspecified     Hallucinations       Current Outpatient Medications   Medication Sig Dispense Refill   • nitroglycerin (NITROSTAT) 0.4 MG SL Tab DISSOLVE 1 TABLET UNDER THE TONGUE EVERY 5 MINUTES AS NEEDED FOR CHEST PAIN. DO NOT EXCEED A TOTAL OF 3 DOSES IN 15 MINUTES. CALL 911 IF NO     • therapeutic multivitamin-minerals (THERAGRAN-M) Tab Take 1 Tab by mouth every day.     • apixaban (ELIQUIS) 5mg Tab Take 1 Tab by mouth 2 Times a Day. 180 Tab 2   • metoprolol SR (TOPROL XL) 25 MG TABLET SR 24 HR Take 1 Tab by mouth every day. 90 Tab 3   • ARIPiprazole (ABILIFY PO) Take  by mouth.     • atorvastatin (LIPITOR) 80 MG tablet Take 1 Tab by mouth every evening. 90 Tab 3   • tramadol (ULTRAM) 50 MG Tab Take 50 mg by mouth every 8 hours as needed for Severe Pain.     • venlafaxine (EFFEXOR-XR) 150 MG extended-release capsule Take 150 mg by mouth every day. 150 mg in the morning at 50 mg in the evening.     • venlafaxine (EFFEXOR) 50 MG tablet Take 50 mg by mouth every bedtime.     • omeprazole (PRILOSEC) 20 MG delayed-release capsule Take 20 mg by mouth every day.     • benazepril (LOTENSIN) 5 MG Tab Take 5 mg by mouth every day.     • levothyroxine  "(SYNTHROID) 200 MCG Tab Take 150 mcg by mouth Every morning on an empty stomach.     • alfuzosin (UROXATRAL) 10 MG SR tablet Take 10 mg by mouth every day.     • Cholecalciferol (VITAMIN D3) 5000 units Cap Take 1 Cap by mouth every day.     • doxycycline (VIBRAMYCIN) 100 MG Cap Take 100 mg by mouth.     • divalproex (DEPAKOTE) 500 MG Tablet Delayed Response Take 1,000 mg by mouth 2 Times a Day.       No current facility-administered medications for this visit.        Physical Exam:  Vitals:    11/02/20 1008   BP: 122/75   BP Location: Left arm   Patient Position: Sitting   BP Cuff Size: Large adult   Pulse: 64   SpO2: 92%   Weight: 116.1 kg (256 lb)   Height: 1.854 m (6' 1\")     General appearance: NAD, conversant  Psych: Alert and oriented to person, place and time    Data:  Labs reviewed    Prior echo/stress reviewed:  LVEF 65%    EKG interpreted by me:  NSR    Impression/Plan:  1. Tremor  REFERRAL TO NEUROLOGY   2. 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 Encounter for loop recorder check       -ILR interrogation reviewed  -?Unclear tremor etiology, refer to neuro  -Minimal AF burden, longest episodes ~4 minutes  -Continue OAC  -F/u in 6 mo    Babak Stacy MD    " Vtama Pregnancy And Lactation Text: It is unknown if this medication can cause problems during pregnancy and breastfeeding.

## 2023-11-17 ENCOUNTER — TELEPHONE (OUTPATIENT)
Dept: CARDIOLOGY | Facility: MEDICAL CENTER | Age: 68
End: 2023-11-17

## 2023-12-17 DIAGNOSIS — I48.0 PAF (PAROXYSMAL ATRIAL FIBRILLATION) (HCC): ICD-10-CM

## 2023-12-19 RX ORDER — CLOPIDOGREL BISULFATE 75 MG/1
TABLET ORAL
Qty: 90 TABLET | Refills: 1 | Status: SHIPPED | OUTPATIENT
Start: 2023-12-19

## 2024-01-05 NOTE — TELEPHONE ENCOUNTER
Caller: Gaye - wife    Topic/issue: They will be switching insurance plans and Eliquis is not going to be covered and wondered if their was an alternative medication that he would be able to take?    Callback Number: [phone] (home)      Thank You  Belg[place] FRY   
S/w patients wife regarding switching insurances. Discussed patients and wife will need to discuss with medication is in their formulary. Patients wife will call back in regards to which medication covered.   
Yes

## 2024-01-13 DIAGNOSIS — I48.0 PAF (PAROXYSMAL ATRIAL FIBRILLATION) (HCC): ICD-10-CM

## 2024-07-07 DIAGNOSIS — I48.0 PAF (PAROXYSMAL ATRIAL FIBRILLATION) (HCC): ICD-10-CM

## 2024-07-08 RX ORDER — CLOPIDOGREL BISULFATE 75 MG/1
TABLET ORAL
Qty: 90 TABLET | Refills: 0 | Status: SHIPPED | OUTPATIENT
Start: 2024-07-08